# Patient Record
Sex: MALE | URBAN - METROPOLITAN AREA
[De-identification: names, ages, dates, MRNs, and addresses within clinical notes are randomized per-mention and may not be internally consistent; named-entity substitution may affect disease eponyms.]

---

## 2024-07-08 PROCEDURE — 93306 TTE W/DOPPLER COMPLETE: CPT | Performed by: INTERNAL MEDICINE

## 2024-08-09 ENCOUNTER — HOSPITAL ENCOUNTER (INPATIENT)
Age: 64
LOS: 6 days | Discharge: HOME | DRG: 432 | End: 2024-08-15
Attending: STUDENT IN AN ORGANIZED HEALTH CARE EDUCATION/TRAINING PROGRAM | Admitting: STUDENT IN AN ORGANIZED HEALTH CARE EDUCATION/TRAINING PROGRAM

## 2024-08-09 DIAGNOSIS — K70.31 ASCITES DUE TO ALCOHOLIC CIRRHOSIS (HCC): ICD-10-CM

## 2024-08-09 DIAGNOSIS — I42.9 CARDIOMYOPATHY, UNSPECIFIED TYPE (HCC): Primary | ICD-10-CM

## 2024-08-09 DIAGNOSIS — K92.1 MELENA: ICD-10-CM

## 2024-08-09 PROBLEM — K92.2 ACUTE UPPER GI BLEEDING: Status: ACTIVE | Noted: 2024-08-09

## 2024-08-09 LAB
ALBUMIN SERPL-MCNC: 2.2 G/DL (ref 3.5–5.2)
ALBUMIN/GLOB SERPL: 1 {RATIO} (ref 1–2.5)
ALP SERPL-CCNC: 83 U/L (ref 40–129)
ALT SERPL-CCNC: 26 U/L (ref 10–50)
AMMONIA PLAS-SCNC: 54 UMOL/L (ref 16–60)
ANION GAP SERPL CALCULATED.3IONS-SCNC: 10 MMOL/L (ref 9–16)
AST SERPL-CCNC: 69 U/L (ref 10–50)
BASOPHILS # BLD: 0 K/UL (ref 0–0.2)
BASOPHILS NFR BLD: 0 % (ref 0–2)
BILIRUB DIRECT SERPL-MCNC: 3.1 MG/DL (ref 0–0.2)
BILIRUB INDIRECT SERPL-MCNC: 4 MG/DL (ref 0–1)
BILIRUB SERPL-MCNC: 7.1 MG/DL (ref 0–1.2)
BUN SERPL-MCNC: 33 MG/DL (ref 8–23)
CALCIUM SERPL-MCNC: 8.2 MG/DL (ref 8.6–10.4)
CHLORIDE SERPL-SCNC: 101 MMOL/L (ref 98–107)
CO2 SERPL-SCNC: 18 MMOL/L (ref 20–31)
CREAT SERPL-MCNC: 1.2 MG/DL (ref 0.7–1.2)
EOSINOPHIL # BLD: 0 K/UL (ref 0–0.4)
EOSINOPHILS RELATIVE PERCENT: 0 % (ref 1–4)
GFR, ESTIMATED: 71 ML/MIN/1.73M2
GLOBULIN SER CALC-MCNC: 4.3 G/DL
GLUCOSE SERPL-MCNC: 116 MG/DL (ref 74–99)
HCT VFR BLD AUTO: 23.6 % (ref 40.7–50.3)
HGB BLD-MCNC: 7.6 G/DL (ref 13–17)
IMM GRANULOCYTES # BLD AUTO: 0.12 K/UL (ref 0–0.3)
IMM GRANULOCYTES NFR BLD: 1 %
INR PPP: 3
LYMPHOCYTES NFR BLD: 1.71 K/UL (ref 1–4.8)
LYMPHOCYTES RELATIVE PERCENT: 14 % (ref 24–44)
MCH RBC QN AUTO: 36.4 PG (ref 25.2–33.5)
MCHC RBC AUTO-ENTMCNC: 32.2 G/DL (ref 28.4–34.8)
MCV RBC AUTO: 112.9 FL (ref 82.6–102.9)
MONOCYTES NFR BLD: 1.59 K/UL (ref 0.1–0.8)
MONOCYTES NFR BLD: 13 % (ref 1–7)
MORPHOLOGY: ABNORMAL
NEUTROPHILS NFR BLD: 72 % (ref 36–66)
NEUTS SEG NFR BLD: 8.78 K/UL (ref 1.8–7.7)
NRBC BLD-RTO: 0 PER 100 WBC
PLATELET # BLD AUTO: ABNORMAL K/UL (ref 138–453)
PLATELET, FLUORESCENCE: 112 K/UL (ref 138–453)
PLATELETS.RETICULATED NFR BLD AUTO: 1.8 % (ref 1.1–10.3)
POTASSIUM SERPL-SCNC: 4.6 MMOL/L (ref 3.7–5.3)
PROT SERPL-MCNC: 6.5 G/DL (ref 6.6–8.7)
PROTHROMBIN TIME: 30.4 SEC (ref 11.7–14.9)
RBC # BLD AUTO: 2.09 M/UL (ref 4.21–5.77)
SODIUM SERPL-SCNC: 129 MMOL/L (ref 136–145)
WBC OTHER # BLD: 12.2 K/UL (ref 3.5–11.3)

## 2024-08-09 PROCEDURE — 2580000003 HC RX 258

## 2024-08-09 PROCEDURE — 2700000000 HC OXYGEN THERAPY PER DAY

## 2024-08-09 PROCEDURE — 6360000002 HC RX W HCPCS

## 2024-08-09 PROCEDURE — 88112 CYTOPATH CELL ENHANCE TECH: CPT

## 2024-08-09 PROCEDURE — 36415 COLL VENOUS BLD VENIPUNCTURE: CPT

## 2024-08-09 PROCEDURE — 6370000000 HC RX 637 (ALT 250 FOR IP)

## 2024-08-09 PROCEDURE — 85025 COMPLETE CBC W/AUTO DIFF WBC: CPT

## 2024-08-09 PROCEDURE — 94640 AIRWAY INHALATION TREATMENT: CPT

## 2024-08-09 PROCEDURE — 85610 PROTHROMBIN TIME: CPT

## 2024-08-09 PROCEDURE — 51702 INSERT TEMP BLADDER CATH: CPT

## 2024-08-09 PROCEDURE — 6370000000 HC RX 637 (ALT 250 FOR IP): Performed by: STUDENT IN AN ORGANIZED HEALTH CARE EDUCATION/TRAINING PROGRAM

## 2024-08-09 PROCEDURE — 82140 ASSAY OF AMMONIA: CPT

## 2024-08-09 PROCEDURE — 87040 BLOOD CULTURE FOR BACTERIA: CPT

## 2024-08-09 PROCEDURE — 2000000000 HC ICU R&B

## 2024-08-09 PROCEDURE — 80048 BASIC METABOLIC PNL TOTAL CA: CPT

## 2024-08-09 PROCEDURE — 87641 MR-STAPH DNA AMP PROBE: CPT

## 2024-08-09 PROCEDURE — 88305 TISSUE EXAM BY PATHOLOGIST: CPT

## 2024-08-09 PROCEDURE — P9047 ALBUMIN (HUMAN), 25%, 50ML: HCPCS

## 2024-08-09 PROCEDURE — 80076 HEPATIC FUNCTION PANEL: CPT

## 2024-08-09 PROCEDURE — 94761 N-INVAS EAR/PLS OXIMETRY MLT: CPT

## 2024-08-09 PROCEDURE — 85055 RETICULATED PLATELET ASSAY: CPT

## 2024-08-09 RX ORDER — SODIUM CHLORIDE 0.9 % (FLUSH) 0.9 %
5-40 SYRINGE (ML) INJECTION PRN
Status: DISCONTINUED | OUTPATIENT
Start: 2024-08-09 | End: 2024-08-15 | Stop reason: HOSPADM

## 2024-08-09 RX ORDER — SPIRONOLACTONE 25 MG/1
12.5 TABLET ORAL DAILY
Status: DISCONTINUED | OUTPATIENT
Start: 2024-08-09 | End: 2024-08-12

## 2024-08-09 RX ORDER — ONDANSETRON 2 MG/ML
4 INJECTION INTRAMUSCULAR; INTRAVENOUS EVERY 6 HOURS PRN
Status: DISCONTINUED | OUTPATIENT
Start: 2024-08-09 | End: 2024-08-15 | Stop reason: HOSPADM

## 2024-08-09 RX ORDER — LACTULOSE 10 G/15ML
20 SOLUTION ORAL 3 TIMES DAILY
Status: DISCONTINUED | OUTPATIENT
Start: 2024-08-09 | End: 2024-08-15 | Stop reason: HOSPADM

## 2024-08-09 RX ORDER — ALBUMIN (HUMAN) 12.5 G/50ML
25 SOLUTION INTRAVENOUS ONCE
Status: COMPLETED | OUTPATIENT
Start: 2024-08-09 | End: 2024-08-09

## 2024-08-09 RX ORDER — FUROSEMIDE 20 MG/1
20 TABLET ORAL DAILY
Status: DISCONTINUED | OUTPATIENT
Start: 2024-08-09 | End: 2024-08-12

## 2024-08-09 RX ORDER — LEVOFLOXACIN 5 MG/ML
500 INJECTION, SOLUTION INTRAVENOUS EVERY 24 HOURS
Status: DISCONTINUED | OUTPATIENT
Start: 2024-08-09 | End: 2024-08-11

## 2024-08-09 RX ORDER — POTASSIUM CHLORIDE 7.45 MG/ML
10 INJECTION INTRAVENOUS PRN
Status: DISCONTINUED | OUTPATIENT
Start: 2024-08-09 | End: 2024-08-15 | Stop reason: HOSPADM

## 2024-08-09 RX ORDER — LEVOFLOXACIN 5 MG/ML
500 INJECTION, SOLUTION INTRAVENOUS EVERY 24 HOURS
Status: DISCONTINUED | OUTPATIENT
Start: 2024-08-09 | End: 2024-08-09

## 2024-08-09 RX ORDER — ACETAMINOPHEN 650 MG/1
650 SUPPOSITORY RECTAL EVERY 6 HOURS PRN
Status: DISCONTINUED | OUTPATIENT
Start: 2024-08-09 | End: 2024-08-15 | Stop reason: HOSPADM

## 2024-08-09 RX ORDER — ONDANSETRON 4 MG/1
4 TABLET, ORALLY DISINTEGRATING ORAL EVERY 8 HOURS PRN
Status: DISCONTINUED | OUTPATIENT
Start: 2024-08-09 | End: 2024-08-15 | Stop reason: HOSPADM

## 2024-08-09 RX ORDER — LEVOFLOXACIN 5 MG/ML
250 INJECTION, SOLUTION INTRAVENOUS EVERY 24 HOURS
Status: DISCONTINUED | OUTPATIENT
Start: 2024-08-09 | End: 2024-08-09

## 2024-08-09 RX ORDER — ACETAMINOPHEN 325 MG/1
650 TABLET ORAL EVERY 6 HOURS PRN
Status: DISCONTINUED | OUTPATIENT
Start: 2024-08-09 | End: 2024-08-15 | Stop reason: HOSPADM

## 2024-08-09 RX ORDER — POLYETHYLENE GLYCOL 3350 17 G/17G
17 POWDER, FOR SOLUTION ORAL DAILY PRN
Status: DISCONTINUED | OUTPATIENT
Start: 2024-08-09 | End: 2024-08-15 | Stop reason: HOSPADM

## 2024-08-09 RX ORDER — ALBUTEROL SULFATE 2.5 MG/3ML
2.5 SOLUTION RESPIRATORY (INHALATION) EVERY 4 HOURS PRN
Status: DISCONTINUED | OUTPATIENT
Start: 2024-08-09 | End: 2024-08-15 | Stop reason: HOSPADM

## 2024-08-09 RX ORDER — LEVOFLOXACIN 5 MG/ML
250 INJECTION, SOLUTION INTRAVENOUS EVERY 24 HOURS
Status: DISCONTINUED | OUTPATIENT
Start: 2024-08-09 | End: 2024-08-11

## 2024-08-09 RX ORDER — POTASSIUM CHLORIDE 20 MEQ/1
40 TABLET, EXTENDED RELEASE ORAL PRN
Status: DISCONTINUED | OUTPATIENT
Start: 2024-08-09 | End: 2024-08-15 | Stop reason: HOSPADM

## 2024-08-09 RX ORDER — SODIUM CHLORIDE 0.9 % (FLUSH) 0.9 %
5-40 SYRINGE (ML) INJECTION EVERY 12 HOURS SCHEDULED
Status: DISCONTINUED | OUTPATIENT
Start: 2024-08-09 | End: 2024-08-15 | Stop reason: HOSPADM

## 2024-08-09 RX ORDER — SODIUM CHLORIDE 9 MG/ML
INJECTION, SOLUTION INTRAVENOUS PRN
Status: DISCONTINUED | OUTPATIENT
Start: 2024-08-09 | End: 2024-08-15 | Stop reason: HOSPADM

## 2024-08-09 RX ORDER — LEVOFLOXACIN 5 MG/ML
750 INJECTION, SOLUTION INTRAVENOUS EVERY 24 HOURS
Status: DISCONTINUED | OUTPATIENT
Start: 2024-08-09 | End: 2024-08-09 | Stop reason: CLARIF

## 2024-08-09 RX ORDER — MAGNESIUM SULFATE IN WATER 40 MG/ML
2000 INJECTION, SOLUTION INTRAVENOUS PRN
Status: DISCONTINUED | OUTPATIENT
Start: 2024-08-09 | End: 2024-08-15 | Stop reason: HOSPADM

## 2024-08-09 RX ORDER — IPRATROPIUM BROMIDE AND ALBUTEROL SULFATE 2.5; .5 MG/3ML; MG/3ML
1 SOLUTION RESPIRATORY (INHALATION) 3 TIMES DAILY
Status: DISCONTINUED | OUTPATIENT
Start: 2024-08-09 | End: 2024-08-10

## 2024-08-09 RX ADMIN — IPRATROPIUM BROMIDE AND ALBUTEROL SULFATE 1 DOSE: .5; 2.5 SOLUTION RESPIRATORY (INHALATION) at 22:52

## 2024-08-09 RX ADMIN — SPIRONOLACTONE 12.5 MG: 25 TABLET, FILM COATED ORAL at 21:03

## 2024-08-09 RX ADMIN — PANTOPRAZOLE SODIUM 8 MG/HR: 40 INJECTION, POWDER, FOR SOLUTION INTRAVENOUS at 20:49

## 2024-08-09 RX ADMIN — FUROSEMIDE 20 MG: 20 TABLET ORAL at 21:03

## 2024-08-09 RX ADMIN — OCTREOTIDE ACETATE 25 MCG/HR: 500 INJECTION, SOLUTION INTRAVENOUS; SUBCUTANEOUS at 20:52

## 2024-08-09 RX ADMIN — SODIUM CHLORIDE: 9 INJECTION, SOLUTION INTRAVENOUS at 20:51

## 2024-08-09 RX ADMIN — SODIUM CHLORIDE, PRESERVATIVE FREE 10 ML: 5 INJECTION INTRAVENOUS at 20:55

## 2024-08-09 RX ADMIN — LEVOFLOXACIN 500 MG: 5 INJECTION, SOLUTION INTRAVENOUS at 21:13

## 2024-08-09 RX ADMIN — LEVOFLOXACIN 250 MG: 5 INJECTION, SOLUTION INTRAVENOUS at 22:33

## 2024-08-09 RX ADMIN — RIFAXIMIN 550 MG: 550 TABLET ORAL at 21:03

## 2024-08-09 RX ADMIN — ALBUMIN (HUMAN) 25 G: 0.25 INJECTION, SOLUTION INTRAVENOUS at 20:54

## 2024-08-09 NOTE — H&P
Critical Care - History and Physical Examination    Patient's name:  Jose Spence  Medical Record Number: [id number]  Patient's account/billing number: [id number]  Patient's YOB: 1960  Age: 64 y.o.  Date of Admission: 8/9/2024  5:27 PM  Reason of ICU admission:   Date of History and Physical Examination: 8/9/2024      Primary Care Physician: No primary care provider on file.  Attending Physician:    Code Status: Full Code    Chief complaint: Shortness of breath likely secondary to hepatopulmonary syndrome secondary to decompensated liver cirrhosis secondary to alcohol abuse    Reason for ICU admission:   Hemoglobin 5.4, active melena, 1 episode of hematemesis, abdominal ascites leading to respiratory distress    History Of Present Illness:   History was obtained from chart review and the patient.      Jose Spence is a 64 y.o. with unknown past medical history as he told us that he has not seen any physician in last 35 years came into the [facility] ICU due to worsening shortness of breath from huge abdominal tense ascites as a transfer from outlying facility.  Of note patient was admitted in [facility] on July 8 for worsening abdominal distention and bloating.  Upon further questioning that time patient stated he is persistently short of breath and has been gradually worsening.  Is been ongoing for last 5 months.  He reported her file and that he is having bloody stool that has been ongoing for last few month since March of this year however he has not seek any medical attention for that.  Patient drinks about 5-6 drinks daily for many years.  He told me that he bought a bar.  He does smoke half a pack a day.  Patient was evaluated by GI in [facility] last month however patient left AMA and never followed up with liver clinic.  GI at that time offered upper EGD and colonoscopy however patient never followed up with them for further consultation.  Today on 8/9/2024 patient

## 2024-08-10 ENCOUNTER — APPOINTMENT (OUTPATIENT)
Dept: ULTRASOUND IMAGING | Age: 64
DRG: 432 | End: 2024-08-10
Attending: STUDENT IN AN ORGANIZED HEALTH CARE EDUCATION/TRAINING PROGRAM

## 2024-08-10 PROBLEM — K70.31 ASCITES DUE TO ALCOHOLIC CIRRHOSIS (HCC): Status: ACTIVE | Noted: 2024-08-10

## 2024-08-10 LAB
A1AT SERPL-MCNC: 79 MG/DL (ref 90–200)
AFP SERPL-MCNC: 3.7 UG/L
ALBUMIN FLD-MCNC: 0.4 G/DL
ALBUMIN SERPL-MCNC: 2.6 G/DL (ref 3.5–5.2)
ALBUMIN/GLOB SERPL: 1 {RATIO} (ref 1–2.5)
ALP SERPL-CCNC: 69 U/L (ref 40–129)
ALT SERPL-CCNC: 25 U/L (ref 10–50)
AMYLASE FLD-CCNC: 23 U/L
ANION GAP SERPL CALCULATED.3IONS-SCNC: 10 MMOL/L (ref 9–16)
ANION GAP SERPL CALCULATED.3IONS-SCNC: 11 MMOL/L (ref 9–16)
ANION GAP SERPL CALCULATED.3IONS-SCNC: 7 MMOL/L (ref 9–16)
APPEARANCE FLD: NORMAL
AST SERPL-CCNC: 65 U/L (ref 10–50)
BASOPHILS # BLD: 0 K/UL (ref 0–0.2)
BASOPHILS NFR BLD: 0 % (ref 0–2)
BILIRUB DIRECT SERPL-MCNC: 3.4 MG/DL (ref 0–0.2)
BILIRUB INDIRECT SERPL-MCNC: 4.7 MG/DL (ref 0–1)
BILIRUB SERPL-MCNC: 8.1 MG/DL (ref 0–1.2)
BODY FLD TYPE: NORMAL
BUN SERPL-MCNC: 37 MG/DL (ref 8–23)
BUN SERPL-MCNC: 40 MG/DL (ref 8–23)
CALCIUM SERPL-MCNC: 8.3 MG/DL (ref 8.6–10.4)
CALCIUM SERPL-MCNC: 8.6 MG/DL (ref 8.6–10.4)
CERULOPLASMIN SERPL-MCNC: 10 MG/DL (ref 15–30)
CHLORIDE SERPL-SCNC: 101 MMOL/L (ref 98–107)
CHLORIDE SERPL-SCNC: 99 MMOL/L (ref 98–107)
CHLORIDE SERPL-SCNC: 99 MMOL/L (ref 98–107)
CHOLEST SERPL-MCNC: 63 MG/DL (ref 0–199)
CHOLESTEROL/HDL RATIO: 4
CLOT CHECK: NORMAL
CO2 SERPL-SCNC: 18 MMOL/L (ref 20–31)
CO2 SERPL-SCNC: 20 MMOL/L (ref 20–31)
CO2 SERPL-SCNC: 22 MMOL/L (ref 20–31)
COLOR FLD: YELLOW
CREAT SERPL-MCNC: 1.3 MG/DL (ref 0.7–1.2)
CREAT SERPL-MCNC: 1.5 MG/DL (ref 0.7–1.2)
EOSINOPHIL # BLD: 0.2 K/UL (ref 0–0.44)
EOSINOPHILS RELATIVE PERCENT: 2 % (ref 1–4)
FERRITIN SERPL-MCNC: 736 NG/ML (ref 30–400)
GFR, ESTIMATED: 54 ML/MIN/1.73M2
GFR, ESTIMATED: 61 ML/MIN/1.73M2
GLOBULIN SER CALC-MCNC: 3.7 G/DL
GLUCOSE BLD-MCNC: 111 MG/DL (ref 75–110)
GLUCOSE BLD-MCNC: 116 MG/DL (ref 75–110)
GLUCOSE BLD-MCNC: 120 MG/DL (ref 75–110)
GLUCOSE FLD-MCNC: 115 MG/DL
GLUCOSE SERPL-MCNC: 105 MG/DL (ref 74–99)
GLUCOSE SERPL-MCNC: 130 MG/DL (ref 74–99)
HAV IGM SERPL QL IA: NONREACTIVE
HBV CORE IGM SERPL QL IA: NONREACTIVE
HBV SURFACE AG SERPL QL IA: NONREACTIVE
HCT VFR BLD AUTO: 19.4 % (ref 40.7–50.3)
HCT VFR BLD AUTO: 20.9 % (ref 40.7–50.3)
HCT VFR BLD AUTO: 22 % (ref 40.7–50.3)
HCT VFR BLD AUTO: 22.9 % (ref 40.7–50.3)
HCV AB SERPL QL IA: REACTIVE
HDLC SERPL-MCNC: 18 MG/DL
HGB BLD-MCNC: 6.6 G/DL (ref 13–17)
HGB BLD-MCNC: 7.1 G/DL (ref 13–17)
HGB BLD-MCNC: 7.5 G/DL (ref 13–17)
HGB BLD-MCNC: 7.6 G/DL (ref 13–17)
IMM GRANULOCYTES # BLD AUTO: 0.1 K/UL (ref 0–0.3)
IMM GRANULOCYTES NFR BLD: 1 %
INR PPP: 3.2
IRON SATN MFR SERPL: ABNORMAL % (ref 20–55)
IRON SERPL-MCNC: 136 UG/DL (ref 61–157)
LDH FLD L TO P-CCNC: 57 U/L
LDLC SERPL CALC-MCNC: 35 MG/DL (ref 0–100)
LYMPHOCYTES NFR BLD: 2.25 K/UL (ref 1.1–3.7)
LYMPHOCYTES NFR FLD: 22 %
LYMPHOCYTES RELATIVE PERCENT: 23 % (ref 24–43)
MCH RBC QN AUTO: 36.3 PG (ref 25.2–33.5)
MCHC RBC AUTO-ENTMCNC: 33.5 G/DL (ref 28.4–34.8)
MCV RBC AUTO: 108.4 FL (ref 82.6–102.9)
MESOTHELIAL CELLS BODY FLUID: 10 %
MONOCYTES NFR BLD: 1.27 K/UL (ref 0.1–1.2)
MONOCYTES NFR BLD: 13 % (ref 3–12)
MONOCYTES NFR FLD: 43 %
MORPHOLOGY: ABNORMAL
MORPHOLOGY: ABNORMAL
MRSA, DNA, NASAL: NEGATIVE
NEUTROPHILS NFR BLD: 61 % (ref 36–65)
NEUTROPHILS NFR FLD: 25 %
NEUTS SEG NFR BLD: 5.98 K/UL (ref 1.5–8.1)
NRBC BLD-RTO: 0.3 PER 100 WBC
NUC CELL # FLD: 40 CELLS/UL
OSMOLALITY SERPL: 286 MOSM/KG (ref 275–295)
PLATELET # BLD AUTO: ABNORMAL K/UL (ref 138–453)
PLATELET, FLUORESCENCE: 90 K/UL (ref 138–453)
PLATELETS.RETICULATED NFR BLD AUTO: 1.9 % (ref 1.1–10.3)
POTASSIUM SERPL-SCNC: 4.2 MMOL/L (ref 3.7–5.3)
POTASSIUM SERPL-SCNC: 4.5 MMOL/L (ref 3.7–5.3)
POTASSIUM SERPL-SCNC: 4.8 MMOL/L (ref 3.7–5.3)
PROT FLD-MCNC: 0.8 G/DL
PROT SERPL-MCNC: 6.3 G/DL (ref 6.6–8.7)
PROTHROMBIN TIME: 31.5 SEC (ref 11.7–14.9)
RBC # BLD AUTO: 1.79 M/UL (ref 4.21–5.77)
RBC # FLD: <3000 CELLS/UL
SODIUM SERPL-SCNC: 128 MMOL/L (ref 136–145)
SODIUM SERPL-SCNC: 129 MMOL/L (ref 136–145)
SODIUM SERPL-SCNC: 130 MMOL/L (ref 136–145)
SPECIMEN DESCRIPTION: NORMAL
SPECIMEN TYPE: NORMAL
TIBC SERPL-MCNC: ABNORMAL UG/DL (ref 250–450)
TRIGL SERPL-MCNC: 53 MG/DL
UNSATURATED IRON BINDING CAPACITY: <17 UG/DL (ref 112–347)
VLDLC SERPL CALC-MCNC: 11 MG/DL
WBC OTHER # BLD: 9.8 K/UL (ref 3.5–11.3)

## 2024-08-10 PROCEDURE — 85018 HEMOGLOBIN: CPT

## 2024-08-10 PROCEDURE — 86901 BLOOD TYPING SEROLOGIC RH(D): CPT

## 2024-08-10 PROCEDURE — 36430 TRANSFUSION BLD/BLD COMPNT: CPT

## 2024-08-10 PROCEDURE — 99291 CRITICAL CARE FIRST HOUR: CPT | Performed by: INTERNAL MEDICINE

## 2024-08-10 PROCEDURE — 6370000000 HC RX 637 (ALT 250 FOR IP)

## 2024-08-10 PROCEDURE — 87205 SMEAR GRAM STAIN: CPT

## 2024-08-10 PROCEDURE — 2000000000 HC ICU R&B

## 2024-08-10 PROCEDURE — 86900 BLOOD TYPING SEROLOGIC ABO: CPT

## 2024-08-10 PROCEDURE — 82042 OTHER SOURCE ALBUMIN QUAN EA: CPT

## 2024-08-10 PROCEDURE — 2580000003 HC RX 258: Performed by: NURSE PRACTITIONER

## 2024-08-10 PROCEDURE — 85055 RETICULATED PLATELET ASSAY: CPT

## 2024-08-10 PROCEDURE — 86920 COMPATIBILITY TEST SPIN: CPT

## 2024-08-10 PROCEDURE — 6360000002 HC RX W HCPCS

## 2024-08-10 PROCEDURE — 0W9G3ZX DRAINAGE OF PERITONEAL CAVITY, PERCUTANEOUS APPROACH, DIAGNOSTIC: ICD-10-PCS | Performed by: INTERNAL MEDICINE

## 2024-08-10 PROCEDURE — P9047 ALBUMIN (HUMAN), 25%, 50ML: HCPCS

## 2024-08-10 PROCEDURE — 82105 ALPHA-FETOPROTEIN SERUM: CPT

## 2024-08-10 PROCEDURE — 82150 ASSAY OF AMYLASE: CPT

## 2024-08-10 PROCEDURE — 83516 IMMUNOASSAY NONANTIBODY: CPT

## 2024-08-10 PROCEDURE — 6370000000 HC RX 637 (ALT 250 FOR IP): Performed by: STUDENT IN AN ORGANIZED HEALTH CARE EDUCATION/TRAINING PROGRAM

## 2024-08-10 PROCEDURE — 83550 IRON BINDING TEST: CPT

## 2024-08-10 PROCEDURE — 82390 ASSAY OF CERULOPLASMIN: CPT

## 2024-08-10 PROCEDURE — 82945 GLUCOSE OTHER FLUID: CPT

## 2024-08-10 PROCEDURE — 86850 RBC ANTIBODY SCREEN: CPT

## 2024-08-10 PROCEDURE — 83930 ASSAY OF BLOOD OSMOLALITY: CPT

## 2024-08-10 PROCEDURE — P9017 PLASMA 1 DONOR FRZ W/IN 8 HR: HCPCS

## 2024-08-10 PROCEDURE — 85025 COMPLETE CBC W/AUTO DIFF WBC: CPT

## 2024-08-10 PROCEDURE — 85014 HEMATOCRIT: CPT

## 2024-08-10 PROCEDURE — 80051 ELECTROLYTE PANEL: CPT

## 2024-08-10 PROCEDURE — 86927 PLASMA FRESH FROZEN: CPT

## 2024-08-10 PROCEDURE — 83615 LACTATE (LD) (LDH) ENZYME: CPT

## 2024-08-10 PROCEDURE — 49083 ABD PARACENTESIS W/IMAGING: CPT | Performed by: INTERNAL MEDICINE

## 2024-08-10 PROCEDURE — 2700000000 HC OXYGEN THERAPY PER DAY

## 2024-08-10 PROCEDURE — P9016 RBC LEUKOCYTES REDUCED: HCPCS

## 2024-08-10 PROCEDURE — 80074 ACUTE HEPATITIS PANEL: CPT

## 2024-08-10 PROCEDURE — 82103 ALPHA-1-ANTITRYPSIN TOTAL: CPT

## 2024-08-10 PROCEDURE — 76705 ECHO EXAM OF ABDOMEN: CPT

## 2024-08-10 PROCEDURE — 6360000002 HC RX W HCPCS: Performed by: NURSE PRACTITIONER

## 2024-08-10 PROCEDURE — 87070 CULTURE OTHR SPECIMN AEROBIC: CPT

## 2024-08-10 PROCEDURE — 80076 HEPATIC FUNCTION PANEL: CPT

## 2024-08-10 PROCEDURE — 99255 IP/OBS CONSLTJ NEW/EST HI 80: CPT | Performed by: STUDENT IN AN ORGANIZED HEALTH CARE EDUCATION/TRAINING PROGRAM

## 2024-08-10 PROCEDURE — 2580000003 HC RX 258

## 2024-08-10 PROCEDURE — 87075 CULTR BACTERIA EXCEPT BLOOD: CPT

## 2024-08-10 PROCEDURE — 85610 PROTHROMBIN TIME: CPT

## 2024-08-10 PROCEDURE — 93975 VASCULAR STUDY: CPT

## 2024-08-10 PROCEDURE — 94761 N-INVAS EAR/PLS OXIMETRY MLT: CPT

## 2024-08-10 PROCEDURE — 82947 ASSAY GLUCOSE BLOOD QUANT: CPT

## 2024-08-10 PROCEDURE — 83540 ASSAY OF IRON: CPT

## 2024-08-10 PROCEDURE — 80061 LIPID PANEL: CPT

## 2024-08-10 PROCEDURE — 82728 ASSAY OF FERRITIN: CPT

## 2024-08-10 PROCEDURE — 36415 COLL VENOUS BLD VENIPUNCTURE: CPT

## 2024-08-10 PROCEDURE — 86376 MICROSOMAL ANTIBODY EACH: CPT

## 2024-08-10 PROCEDURE — 89051 BODY FLUID CELL COUNT: CPT

## 2024-08-10 PROCEDURE — APPNB60 APP NON BILLABLE TIME 46-60 MINS: Performed by: NURSE PRACTITIONER

## 2024-08-10 PROCEDURE — 86225 DNA ANTIBODY NATIVE: CPT

## 2024-08-10 PROCEDURE — 84157 ASSAY OF PROTEIN OTHER: CPT

## 2024-08-10 PROCEDURE — 86038 ANTINUCLEAR ANTIBODIES: CPT

## 2024-08-10 PROCEDURE — 80048 BASIC METABOLIC PNL TOTAL CA: CPT

## 2024-08-10 PROCEDURE — 30233N1 TRANSFUSION OF NONAUTOLOGOUS RED BLOOD CELLS INTO PERIPHERAL VEIN, PERCUTANEOUS APPROACH: ICD-10-PCS

## 2024-08-10 RX ORDER — SODIUM CHLORIDE 9 MG/ML
INJECTION, SOLUTION INTRAVENOUS PRN
Status: COMPLETED | OUTPATIENT
Start: 2024-08-10 | End: 2024-08-10

## 2024-08-10 RX ORDER — FUROSEMIDE 20 MG/1
20 TABLET ORAL ONCE
Status: COMPLETED | OUTPATIENT
Start: 2024-08-10 | End: 2024-08-11

## 2024-08-10 RX ORDER — OXYCODONE HYDROCHLORIDE 5 MG/1
5 TABLET ORAL EVERY 4 HOURS PRN
Status: DISCONTINUED | OUTPATIENT
Start: 2024-08-10 | End: 2024-08-15 | Stop reason: HOSPADM

## 2024-08-10 RX ORDER — ALBUMIN (HUMAN) 12.5 G/50ML
25 SOLUTION INTRAVENOUS ONCE
Status: COMPLETED | OUTPATIENT
Start: 2024-08-10 | End: 2024-08-10

## 2024-08-10 RX ADMIN — PANTOPRAZOLE SODIUM 8 MG/HR: 40 INJECTION, POWDER, FOR SOLUTION INTRAVENOUS at 14:50

## 2024-08-10 RX ADMIN — OCTREOTIDE ACETATE 50 MCG/HR: 500 INJECTION, SOLUTION INTRAVENOUS; SUBCUTANEOUS at 14:49

## 2024-08-10 RX ADMIN — ALBUMIN (HUMAN) 25 G: 0.25 INJECTION, SOLUTION INTRAVENOUS at 00:53

## 2024-08-10 RX ADMIN — ALBUMIN (HUMAN) 25 G: 0.25 INJECTION, SOLUTION INTRAVENOUS at 14:10

## 2024-08-10 RX ADMIN — LEVOFLOXACIN 250 MG: 5 INJECTION, SOLUTION INTRAVENOUS at 21:38

## 2024-08-10 RX ADMIN — SODIUM CHLORIDE, PRESERVATIVE FREE 10 ML: 5 INJECTION INTRAVENOUS at 19:52

## 2024-08-10 RX ADMIN — LACTULOSE 20 G: 20 SOLUTION ORAL at 21:35

## 2024-08-10 RX ADMIN — PANTOPRAZOLE SODIUM 8 MG/HR: 40 INJECTION, POWDER, FOR SOLUTION INTRAVENOUS at 04:58

## 2024-08-10 RX ADMIN — SODIUM CHLORIDE: 9 INJECTION, SOLUTION INTRAVENOUS at 09:31

## 2024-08-10 RX ADMIN — SODIUM CHLORIDE: 9 INJECTION, SOLUTION INTRAVENOUS at 22:56

## 2024-08-10 RX ADMIN — OXYCODONE HYDROCHLORIDE 5 MG: 5 TABLET ORAL at 21:35

## 2024-08-10 RX ADMIN — Medication 5 MG: at 02:47

## 2024-08-10 RX ADMIN — LEVOFLOXACIN 500 MG: 5 INJECTION, SOLUTION INTRAVENOUS at 20:03

## 2024-08-10 RX ADMIN — SODIUM CHLORIDE, PRESERVATIVE FREE 10 ML: 5 INJECTION INTRAVENOUS at 09:36

## 2024-08-10 RX ADMIN — RIFAXIMIN 550 MG: 550 TABLET ORAL at 21:30

## 2024-08-10 RX ADMIN — PHYTONADIONE 10 MG: 10 INJECTION, EMULSION INTRAMUSCULAR; INTRAVENOUS; SUBCUTANEOUS at 12:18

## 2024-08-10 ASSESSMENT — PAIN SCALES - GENERAL
PAINLEVEL_OUTOF10: 7
PAINLEVEL_OUTOF10: 0
PAINLEVEL_OUTOF10: 4
PAINLEVEL_OUTOF10: 7
PAINLEVEL_OUTOF10: 0
PAINLEVEL_OUTOF10: 7

## 2024-08-10 ASSESSMENT — PAIN DESCRIPTION - DESCRIPTORS
DESCRIPTORS: ACHING
DESCRIPTORS: ACHING

## 2024-08-10 ASSESSMENT — PAIN DESCRIPTION - ORIENTATION
ORIENTATION: RIGHT;UPPER
ORIENTATION: INNER;UPPER

## 2024-08-10 ASSESSMENT — PAIN DESCRIPTION - LOCATION
LOCATION: LEG
LOCATION: LEG

## 2024-08-10 NOTE — CARE COORDINATION
Case Management Assessment  Initial Evaluation    Date/Time of Evaluation: 8/10/2024 12:34 PM  Assessment Completed by: ROB NARVAEZ RN    If patient is discharged prior to next notation, then this note serves as note for discharge by case management.    Patient Name: Jose Spence                   YOB: 1960  Diagnosis: Acute upper GI bleeding [K92.2]                   Date / Time: 8/9/2024  5:27 PM    Patient Admission Status: Inpatient   Readmission Risk (Low < 19, Mod (19-27), High > 27): Readmission Risk Score: 13.9    Current PCP: No primary care provider on file.  PCP verified by CM? (P) No (NO PCP, list provided w/ [facility] in [place])    Chart Reviewed: Yes      History Provided by: (P) Patient  Patient Orientation: (P) Alert and Oriented    Patient Cognition: (P) Alert    Hospitalization in the last 30 days (Readmission):  No    If yes, Readmission Assessment in CM Navigator will be completed.    Advance Directives:      Code Status: Full Code   Patient's Primary Decision Maker is: (P) Legal Next of Kin      Discharge Planning:    Patient lives with:   Type of Home: (P) House, Other (Comment) (Business/ Home (upper level))  Primary Care Giver: (P) Self  Patient Support Systems include: (P) Spouse/Significant Other   Current Financial resources: (P) HELP (No insurance, LM for [employer] w/ HELP)  Current community resources: (P) None  Current services prior to admission: (P) None            Current DME:              Type of Home Care services:  (P) None    ADLS  Prior functional level: (P) Independent in ADLs/IADLs  Current functional level: (P) Independent in ADLs/IADLs    PT AM-PAC:   /24  OT AM-PAC:   /24    Family can provide assistance at DC: (P) No  Would you like Case Management to discuss the discharge plan with any other family members/significant others, and if so, who? (P) Yes (SO)  Plans to Return to Present Housing: (P) Unknown at present  Other Identified  Issues/Barriers to RETURNING to current housing: Medical condition  Potential Assistance needed at discharge: (P) Long Term Acute Care, Skilled Nursing Facility, Home Care, Transportation            Potential DME:    Patient expects to discharge to: (P) House  Plan for transportation at discharge:      Financial    Payor: /     Does insurance require precert for SNF: no insurance- will need HELP    Potential assistance Purchasing Medications: (P) Yes  Meds-to-Beds request: Yes    No Pharmacies Listed    Notes:    Factors facilitating achievement of predicted outcomes:     Barriers to discharge: Medical complications    Additional Case Management Notes: Paracentesis today, possible EGD on Monday, H & H Q6hrs, PT/OT ordered, plan is home independently, friend will transport, no PCP- list provided, no Insurance- will need HELP and Med Assist.     Patient states he has two children, does not want them listed as emergency contacts- no other informed provided concerning children.     Pt lives at [address] [place] [address], address updated in EPIC    The Plan for Transition of Care is related to the following treatment goals of Acute upper GI bleeding [K92.2]    IF APPLICABLE: The Patient and/or patient representative Jose and his family were provided with a choice of provider and agrees with the discharge plan. Freedom of choice list with basic dialogue that supports the patient's individualized plan of care/goals and shares the quality data associated with the providers was provided to: (P) Patient   Patient Representative Name:       The Patient and/or Patient Representative Agree with the Discharge Plan? (P) Yes (plan is home)    ROB NARVAEZ RN  Case Management Department  Ph: [phone] Fax: [phone]

## 2024-08-10 NOTE — PLAN OF CARE
Problem: Discharge Planning  Goal: Discharge to home or other facility with appropriate resources  Outcome: Progressing     Problem: Skin/Tissue Integrity  Goal: Absence of new skin breakdown  Description: 1.  Monitor for areas of redness and/or skin breakdown  2.  Assess vascular access sites hourly  3.  Every 4-6 hours minimum:  Change oxygen saturation probe site  4.  Every 4-6 hours:  If on nasal continuous positive airway pressure, respiratory therapy assess nares and determine need for appliance change or resting period.  Outcome: Progressing     Problem: Safety - Adult  Goal: Free from fall injury  Outcome: Progressing     Problem: Respiratory - Adult  Goal: Achieves optimal ventilation and oxygenation  8/9/2024 2346 by Mercedez Bourne RN  Outcome: Progressing  8/9/2024 2256 by Selina Sow RCP  Outcome: Progressing

## 2024-08-10 NOTE — RT PROTOCOL NOTE
RT Inhaler-Nebulizer Bronchodilator Protocol Note    There is a bronchodilator order in the chart from a provider indicating to follow the RT Bronchodilator Protocol and there is an “Initiate RT Inhaler-Nebulizer Bronchodilator Protocol” order as well (see protocol at bottom of note).    CXR Findings:  No results found.    The findings from the last RT Protocol Assessment were as follows:   History Pulmonary Disease: Smoker 15 pack years or more  Respiratory Pattern: Regular pattern and RR 12-20 bpm  Breath Sounds: Inspiratory and expiratory or bilateral wheezing and/or rhonchi  Cough: Strong, spontaneous, non-productive  Indication for Bronchodilator Therapy: Wheezing associated with pulm disorder  Bronchodilator Assessment Score: 7    Aerosolized bronchodilator medication orders have been revised according to the RT Inhaler-Nebulizer Bronchodilator Protocol below.    Respiratory Therapist to perform RT Therapy Protocol Assessment initially then follow the protocol.  Repeat RT Therapy Protocol Assessment PRN for score 0-3 or on second treatment, BID, and PRN for scores above 3.    No Indications - adjust the frequency to every 6 hours PRN wheezing or bronchospasm, if no treatments needed after 48 hours then discontinue using Per Protocol order mode.     If indication present, adjust the RT bronchodilator orders based on the Bronchodilator Assessment Score as indicated below.  Use Inhaler orders unless patient has one or more of the following: on home nebulizer, not able to hold breath for 10 seconds, is not alert and oriented, cannot activate and use MDI correctly, or respiratory rate 25 breaths per minute or more, then use the equivalent nebulizer order(s) with same Frequency and PRN reasons based on the score.  If a patient is on this medication at home then do not decrease Frequency below that used at home.    0-3 - enter or revise RT bronchodilator order(s) to equivalent RT Bronchodilator order with Frequency

## 2024-08-10 NOTE — PROGRESS NOTES
1440: Paracentesis completed at bedside. 4800cc drained sample labeled and sent to Lab. VS remained stable throughout procedure.

## 2024-08-10 NOTE — ACP (ADVANCE CARE PLANNING)
Advance Care Planning     Advance Care Planning Inpatient Note  Spiritual Care Department    Today's Date: 8/10/2024  Unit: [facility] CAR 3- MICU    Received request from Other: Spiritual Care Consult . Upon review of chart and communication with care team, patient's decision making abilities are not in question. Patient was/were present in the room during visit.    Goals of ACP Conversation:  Discuss advance care planning documents    Health Care Decision Makers:    No healthcare decision makers have been documented.   Patient indicated that he would like his friend, Martina, named as his POA-H, however, he requested that paperwork be completed at a later time.   Summary:  No Decision Maker named by patient at this time    Advance Care Planning Documents (Patient Wishes):  None     Assessment:   visited patient per Spiritual Care Consult for Advance Directives. Patient was admitted to the hospital due to an \"Acute GI Bleed.\"  experienced patient as having \"labored breathing.\"     Interventions:  Provided education on documents for clarity and greater understanding  Encouraged ongoing ACP conversation with future decision makers and loved ones  Reviewed but did not complete ACP document    Care Preferences Communicated:   No    Outcomes/Plan:  ACP Discussion: Postponed    Electronically signed by TIMMY Avalos on 8/10/2024 at 2:19 AM

## 2024-08-10 NOTE — PROGRESS NOTES
Critical Care - Progress Note     Patient's name:  Jose Spence  Medical Record Number: [id number]  Patient's account/billing number: [id number]  Patient's YOB: 1960  Age: 64 y.o.  Date of Admission: 8/9/2024  5:27 PM  Reason of ICU admission:   Date of History and Physical Examination: 8/10/2024      Primary Care Physician: No primary care provider on file.  Attending Physician:    Code Status: Full Code    Chief complaint: Shortness of breath likely secondary to hepatopulmonary syndrome secondary to decompensated liver cirrhosis secondary to alcohol abuse    Reason for ICU admission:   Hemoglobin 5.4, active melena, 1 episode of hematemesis, abdominal ascites leading to respiratory distress    Interval History     Breathing on 2L NC today  Hemodynamically stable     Still experiencing tense ascites secondary to likely cirrhosis. Bedside paracentesis completed today. Received 3 x albumin over the past 24 hours    INR - 3.2. ordered vitamin K and FFP     Sodium 129. Repeat BMP pending    Patient hepatitis C ab positive- Hcv quantitative and genotype ordered    GI consulted     History Of Present Illness:   History was obtained from chart review and the patient.      Jose Spence is a 64 y.o. with unknown past medical history as he told us that he has not seen any physician in last 35 years came into the [facility] ICU due to worsening shortness of breath from huge abdominal tense ascites as a transfer from outlying facility.  Of note patient was admitted in [facility] on July 8 for worsening abdominal distention and bloating.  Upon further questioning that time patient stated he is persistently short of breath and has been gradually worsening.  Is been ongoing for last 5 months.  He reported her file and that he is having bloody stool that has been ongoing for last few month since March of this year however he has not seek any medical attention for that.  Patient drinks about 5-6  atelectasis with borderline enlargement of the main pulmonary artery with mural prominence throughout the thoracic esophagus      Last CT abdomen and pelvis with IV contrast showed large volume ascites with bilateral large pleural effusion with diffuse anasarca, cirrhotic liver morphology, portal vein patent, no large varices identified       Assessment and Plan     Patient Active Problem List   Diagnosis    Acute upper GI bleeding    Ascites due to alcoholic cirrhosis (HCC)       Plan:  Acute decompensated liver cirrhosis:  MELD 3.0: 34 at 8/10/2024  7:01 AM  MELD-Na: 33 at 8/10/2024  7:01 AM  Calculated from:  Serum Creatinine: 1.5 mg/dL at 8/10/2024  7:01 AM  Serum Sodium: 129 mmol/L at 8/10/2024  7:01 AM  Total Bilirubin: 8.1 mg/dL at 8/10/2024  7:01 AM  Serum Albumin: 2.6 g/dL at 8/10/2024  7:01 AM  INR(ratio): 3.2 at 8/10/2024  7:01 AM  Age at listing (hypothetical): 64 years  Sex: Male at 8/10/2024  7:01 AM     Likely secondary to alcohol  Complicated by huge abdominal tense ascites  Hematochezia likely secondary to rectal varix  GI consulted  Hepatitis C- ab positive. Quantitative and genotype ordered   Will start Protonix and octreotide infusion    Tense abdominal ascites with bilateral pleural effusion  - Likely secondary to decompensated liver cirrhosis resulting in hepatic hydrothorax versus hepatopulmonary syndrome  - CT chest from outlying facility revealed huge bilateral pleural effusion with compressive atelectasis  - IR guided paracentesis ordered and fluid study for IR guided para have been ordered, will follow-up with the result  - GI consulted  - lasix and aldactone held due to ELIO     Concern for SBP:  Currently afebrile, leukocyte count 12.2  Will start IV Levaquin 750 daily  Could not start IV Rocephin because of allergic reaction.  Bedside paracentesis ordered     Hematochezia/melena:  Likely secondary to bleeding from the portosystemic anastomosis in the rectum  GI consulted  Will continue

## 2024-08-10 NOTE — CONSENT
Informed Consent for Blood Component Transfusion Note    I have discussed with the patient the rationale for blood component transfusion; its benefits in treating or preventing fatigue, organ damage, or death; and its risk which includes mild transfusion reactions, rare risk of blood borne infection, or more serious but rare reactions. I have discussed the alternatives to transfusion, including the risk and consequences of not receiving transfusion. The patient had an opportunity to ask questions and had agreed to proceed with transfusion of blood components.    Electronically signed by Flo Diaz MD on 8/10/24 at 9:04 AM EDT

## 2024-08-10 NOTE — PROGRESS NOTES
Spiritual Health Assessment/Progress Note  [facility]    Advance Care Planning,    Name: Jose Spence MRN: [id number]    Age: 64 y.o.     Sex: male   Language: English   Synagogue: No Muslim on file   Acute upper GI bleeding     Date: 8/9/2024            Total Time Calculated: (P) 17 min              Spiritual Assessment began in [facility] CAR 3- MICU        Referral/Consult From: Multi-disciplinary team (Spiritual Care Consult)   Encounter Overview/Reason: Advance Care Planning  Service Provided For: Patient    Narrative:  visited patient per Spiritual Care Consult for Advance Directives. Patient declined completing the paperwork this evening, however, he mentioned his interest in completing paperwork tomorrow. Patient stated \"everybody says I'm dying.\"  shared words of comfort and encouragement.    Jane, Belief, Meaning:   Patient Other: patient did not disclose  Family/Friends No family/friends present      Importance and Influence:  Patient Other: patient did not disclose  Family/Friends no family/friends present    Community:  Patient Other:   Patient mentioned that a friend, Martina, will be visiting tomorrow. Per patient, she is who he would like to name as his POA-H.  Family/Friends Other: No family/friends present    Assessment and Plan of Care:     Patient Interventions include: Facilitated expression of thoughts and feelings, Affirmed coping skills/support systems, and Assisted in Advance Care Planning conversation  Family/Friends Interventions include: Other: No family/friends present    Patient Plan of Care: Spiritual Care available upon further referral  Family/Friends Plan of Care: Spiritual Care available upon further referral    Electronically signed by TIMMY Avalos on 8/9/2024 at 9:46 PM

## 2024-08-10 NOTE — PLAN OF CARE
Problem: Skin/Tissue Integrity  Goal: Absence of new skin breakdown  Description: 1.  Monitor for areas of redness and/or skin breakdown  2.  Assess vascular access sites hourly  3.  Every 4-6 hours minimum:  Change oxygen saturation probe site  4.  Every 4-6 hours:  If on nasal continuous positive airway pressure, respiratory therapy assess nares and determine need for appliance change or resting period.  8/10/2024 0941 by Andrei Ann RN  Outcome: Progressing     Problem: Safety - Adult  Goal: Free from fall injury  8/10/2024 0941 by Andrei Ann RN  Outcome: Progressing     Problem: Respiratory - Adult  Goal: Achieves optimal ventilation and oxygenation  8/10/2024 0941 by Andrei Ann, RN  Outcome: Progressing     Problem: Discharge Planning  Goal: Discharge to home or other facility with appropriate resources  8/10/2024 0941 by Andrei Ann, RN  Outcome: Progressing

## 2024-08-10 NOTE — CONSULTS
[facility]  Consultation Note     .  Chief Complaint:  Increased dyspnea  Increased abdominal distention    Reason for consult:    Decompensated cirrhosis with melena    History of present illness: This is a 64 y.o. male with unknown PMH as he has not seen a physician in over 35 years who originally presented to the hospital with increased dyspnea, increased abdominal distention present over the last few weeks.  Patient states that he has noticed increase in abdominal distention over the last few months but recently has become much worse, the distention is interfering with his eating and his breathing.  Patient also admits to intermittent melena over the last few months.   Patient is a chronic alcoholic consuming 5-6 beers daily and states that he stopped drinking July 6.  Patient continues to smoke half pack cigarettes daily/   Smokes marijuana daily, denies other street drug use  Patient denies any previous diagnosis of cirrhosis or cancer because he has not seen a doctor in many many years  Patient had initially been seen at OSH with a hemoglobin of 5.5 g/dL for which she received 2 units of PRBCs and then transferred to [facility].    Upon arrival his hemoglobin was 7.4 g/dL-and now 6.6 g/dL this a.m., WBCs 8.3, , lactic acid 2.9  LFTs show albumin of 2.6, AST 65, T. bili 7.1-8.1 with direct bili of 3.4, ammonia 54, INR 3-3.2  No rectal bleeding since admission  Per H&P note, patient had CT chest without contrast that showed large pleural effusion with pulmonary volume loss likely secondary to compressive atelectasis  CT abdomen and pelvis with contrast showed large volume ascites with bilateral large pleural effusions with diffuse anasarca, cirrhotic liver morphology and patent portal vein      Previous GI history:   None  No personal or family history of GI malignancy    Past Medical/Social/Family History:  See above  Previous records/history/ and notes were  distress-patient very short of breath most likely due to pulmonary fluid  Anasarca-patient with third spacing up to thighs, with pitting  Hyponatremia  Coagulopathy-patient received vitamin K    MELD 3.0: 34 at 8/10/2024  7:01 AM  MELD-Na: 33 at 8/10/2024  7:01 AM  Calculated from:  Serum Creatinine: 1.5 mg/dL at 8/10/2024  7:01 AM  Serum Sodium: 129 mmol/L at 8/10/2024  7:01 AM  Total Bilirubin: 8.1 mg/dL at 8/10/2024  7:01 AM  Serum Albumin: 2.6 g/dL at 8/10/2024  7:01 AM  INR(ratio): 3.2 at 8/10/2024  7:01 AM  Age at listing (hypothetical): 64 years  Sex: Male at 8/10/2024  7:01 AM      Recommendations and plan:    Liver ultrasound with Doppler to rule out portal vein thrombosis-May need to be completed after paracentesis  Recommend paracentesis with fluid analysis and albumin  Continue to monitor for active GI bleeding  Trend hemoglobin, transfuse as needed  Daily labs including CBC BMP LFT INR.  Additional liver serologies ordered  Continue current medical management  Agree with antibiotics  Continue PPI drip, continue octreotide drip  Once patient has had thoracentesis may consider endoscopy/colonoscopy  Monitor for alcohol withdrawal, recommend CIWA  Aspiration and fall precautions  Daily weight, tricked I's and O's  Assess for occult sepsis and treat accordingly  Avoid hepatotoxic agents, NSAIDs and narcotics if possible  Recommend nephrology consult given ELIO, possible need for diuresis in the setting of cirrhosis  Okay for few ice chips-please do not feed patient in case he he develops overt GI bleeding and requires urgent endoscopy      This plan was formulated in collaboration with Dr. Sheryl BECKER  Please feel free to contact us with any questions or concerns      [facility]  Bharath Nolen, PEGGY - CNP   [phone]  8/10/2024    11:58 AM     Estimated time of 80 mins reviewing chart, assessing patient and formulating plan of care    This note was created

## 2024-08-10 NOTE — PROCEDURES
PROCEDURE NOTE  Date: 8/10/2024   Name: Jose Spence  YOB: 1960    Paracentesis    Date/Time: 8/10/2024 6:05 PM    Performed by: Roni Medrano MD  Authorized by: Roni Medrano MD  Consent: Verbal consent obtained.  Risks and benefits: risks, benefits and alternatives were discussed  Consent given by: patient  Patient understanding: patient states understanding of the procedure being performed  Patient consent: the patient's understanding of the procedure matches consent given  Procedure consent: procedure consent matches procedure scheduled  Relevant documents: relevant documents present and verified  Test results: test results available and properly labeled  Site marked: the operative site was marked  Imaging studies: imaging studies available  Required items: required blood products, implants, devices, and special equipment available  Patient identity confirmed: verbally with patient  Time out: Immediately prior to procedure a \"time out\" was called to verify the correct patient, procedure, equipment, support staff and site/side marked as required.  Initial or subsequent exam: initial  Procedure purpose: diagnostic and therapeutic  Indications: abdominal discomfort secondary to ascites and new onset ascites  Anesthesia: local infiltration    Anesthesia:  Local Anesthetic: lidocaine 1% without epinephrine  Anesthetic total: 10 mL    Sedation:  Patient sedated: no    Preparation: Patient was prepped and draped in the usual sterile fashion.  Needle gauge: 5 F 7 cm.  Ultrasound guidance: yes  Puncture site: right lower quadrant  Fluid removed: 5000(ml)  Fluid appearance: serous  Dressing: 4x4 sterile gauze  Patient tolerance: patient tolerated the procedure well with no immediate complications           Procedure Note    Pre-operative Diagnosis: ascitis     Post-operative Diagnosis: same    Indications: Jose Spence is a 64 y.o. male with a  has no past medical history on file. found to have a

## 2024-08-11 PROBLEM — K72.90 DECOMPENSATION OF CIRRHOSIS OF LIVER (HCC): Status: ACTIVE | Noted: 2024-08-11

## 2024-08-11 PROBLEM — N17.9 AKI (ACUTE KIDNEY INJURY) (HCC): Status: ACTIVE | Noted: 2024-08-11

## 2024-08-11 PROBLEM — K74.60 DECOMPENSATION OF CIRRHOSIS OF LIVER (HCC): Status: ACTIVE | Noted: 2024-08-11

## 2024-08-11 PROBLEM — K76.82 HEPATIC ENCEPHALOPATHY (HCC): Status: ACTIVE | Noted: 2024-08-11

## 2024-08-11 LAB
ALBUMIN SERPL-MCNC: 2.4 G/DL (ref 3.5–5.2)
ALBUMIN/GLOB SERPL: 1 {RATIO} (ref 1–2.5)
ALP SERPL-CCNC: 50 U/L (ref 40–129)
ALT SERPL-CCNC: 18 U/L (ref 10–50)
ANION GAP SERPL CALCULATED.3IONS-SCNC: 7 MMOL/L (ref 9–16)
AST SERPL-CCNC: 44 U/L (ref 10–50)
BASOPHILS # BLD: 0 K/UL (ref 0–0.2)
BASOPHILS NFR BLD: 0 % (ref 0–2)
BILIRUB DIRECT SERPL-MCNC: 4 MG/DL (ref 0–0.2)
BILIRUB INDIRECT SERPL-MCNC: 5.1 MG/DL (ref 0–1)
BILIRUB SERPL-MCNC: 9.1 MG/DL (ref 0–1.2)
BLOOD BANK BLOOD PRODUCT EXPIRATION DATE: NORMAL
BLOOD BANK DISPENSE STATUS: NORMAL
BLOOD BANK ISBT PRODUCT BLOOD TYPE: 6200
BLOOD BANK PRODUCT CODE: NORMAL
BLOOD BANK UNIT TYPE AND RH: NORMAL
BPU ID: NORMAL
BUN SERPL-MCNC: 34 MG/DL (ref 8–23)
CALCIUM SERPL-MCNC: 8.3 MG/DL (ref 8.6–10.4)
CHLORIDE SERPL-SCNC: 102 MMOL/L (ref 98–107)
CO2 SERPL-SCNC: 21 MMOL/L (ref 20–31)
CO2 SERPL-SCNC: 21 MMOL/L (ref 20–31)
CO2 SERPL-SCNC: 22 MMOL/L (ref 20–31)
COMPONENT: NORMAL
CREAT SERPL-MCNC: 1.1 MG/DL (ref 0.7–1.2)
EOSINOPHIL # BLD: 0.23 K/UL (ref 0–0.44)
EOSINOPHILS RELATIVE PERCENT: 3 % (ref 1–4)
ERYTHROCYTE [DISTWIDTH] IN BLOOD BY AUTOMATED COUNT: 24 % (ref 11.8–14.4)
GFR, ESTIMATED: 79 ML/MIN/1.73M2
GLOBULIN SER CALC-MCNC: 3 G/DL
GLUCOSE SERPL-MCNC: 87 MG/DL (ref 74–99)
HCT VFR BLD AUTO: 20.9 % (ref 40.7–50.3)
HCT VFR BLD AUTO: 21 % (ref 40.7–50.3)
HCT VFR BLD AUTO: 25.2 % (ref 40.7–50.3)
HGB BLD-MCNC: 7 G/DL (ref 13–17)
HGB BLD-MCNC: 7.1 G/DL (ref 13–17)
HGB BLD-MCNC: 8.9 G/DL (ref 13–17)
IMM GRANULOCYTES # BLD AUTO: 0.08 K/UL (ref 0–0.3)
IMM GRANULOCYTES NFR BLD: 1 %
INR PPP: 2.9
LYMPHOCYTES NFR BLD: 1.29 K/UL (ref 1.1–3.7)
LYMPHOCYTES RELATIVE PERCENT: 17 % (ref 24–43)
MCH RBC QN AUTO: 35.7 PG (ref 25.2–33.5)
MCHC RBC AUTO-ENTMCNC: 33.5 G/DL (ref 28.4–34.8)
MCV RBC AUTO: 106.6 FL (ref 82.6–102.9)
MONOCYTES NFR BLD: 0.99 K/UL (ref 0.1–1.2)
MONOCYTES NFR BLD: 13 % (ref 3–12)
MORPHOLOGY: ABNORMAL
MORPHOLOGY: ABNORMAL
NEUTROPHILS NFR BLD: 66 % (ref 36–65)
NEUTS SEG NFR BLD: 5.01 K/UL (ref 1.5–8.1)
NRBC BLD-RTO: 0 PER 100 WBC
PLATELET # BLD AUTO: 63 K/UL (ref 138–453)
PMV BLD AUTO: 9.6 FL (ref 8.1–13.5)
POTASSIUM SERPL-SCNC: 4.1 MMOL/L (ref 3.7–5.3)
POTASSIUM SERPL-SCNC: 4.1 MMOL/L (ref 3.7–5.3)
POTASSIUM SERPL-SCNC: 4.7 MMOL/L (ref 3.7–5.3)
PROT SERPL-MCNC: 5.4 G/DL (ref 6.6–8.7)
PROTHROMBIN TIME: 29.5 SEC (ref 11.7–14.9)
RBC # BLD AUTO: 1.96 M/UL (ref 4.21–5.77)
SODIUM SERPL-SCNC: 130 MMOL/L (ref 136–145)
SODIUM SERPL-SCNC: 130 MMOL/L (ref 136–145)
SODIUM SERPL-SCNC: 131 MMOL/L (ref 136–145)
TRANSFUSION STATUS: NORMAL
UNIT DIVISION: 0
UNIT ISSUE DATE/TIME: NORMAL
WBC OTHER # BLD: 7.6 K/UL (ref 3.5–11.3)

## 2024-08-11 PROCEDURE — 6360000002 HC RX W HCPCS: Performed by: NURSE PRACTITIONER

## 2024-08-11 PROCEDURE — 6370000000 HC RX 637 (ALT 250 FOR IP)

## 2024-08-11 PROCEDURE — 85018 HEMOGLOBIN: CPT

## 2024-08-11 PROCEDURE — 2700000000 HC OXYGEN THERAPY PER DAY

## 2024-08-11 PROCEDURE — 2580000003 HC RX 258: Performed by: NURSE PRACTITIONER

## 2024-08-11 PROCEDURE — 80048 BASIC METABOLIC PNL TOTAL CA: CPT

## 2024-08-11 PROCEDURE — 2580000003 HC RX 258

## 2024-08-11 PROCEDURE — 94761 N-INVAS EAR/PLS OXIMETRY MLT: CPT

## 2024-08-11 PROCEDURE — 2000000000 HC ICU R&B

## 2024-08-11 PROCEDURE — 99233 SBSQ HOSP IP/OBS HIGH 50: CPT | Performed by: STUDENT IN AN ORGANIZED HEALTH CARE EDUCATION/TRAINING PROGRAM

## 2024-08-11 PROCEDURE — 85025 COMPLETE CBC W/AUTO DIFF WBC: CPT

## 2024-08-11 PROCEDURE — 6360000002 HC RX W HCPCS

## 2024-08-11 PROCEDURE — 6370000000 HC RX 637 (ALT 250 FOR IP): Performed by: STUDENT IN AN ORGANIZED HEALTH CARE EDUCATION/TRAINING PROGRAM

## 2024-08-11 PROCEDURE — 99291 CRITICAL CARE FIRST HOUR: CPT | Performed by: INTERNAL MEDICINE

## 2024-08-11 PROCEDURE — 80076 HEPATIC FUNCTION PANEL: CPT

## 2024-08-11 PROCEDURE — 36430 TRANSFUSION BLD/BLD COMPNT: CPT

## 2024-08-11 PROCEDURE — 87522 HEPATITIS C REVRS TRNSCRPJ: CPT

## 2024-08-11 PROCEDURE — 85610 PROTHROMBIN TIME: CPT

## 2024-08-11 PROCEDURE — 80051 ELECTROLYTE PANEL: CPT

## 2024-08-11 PROCEDURE — 85014 HEMATOCRIT: CPT

## 2024-08-11 PROCEDURE — 36415 COLL VENOUS BLD VENIPUNCTURE: CPT

## 2024-08-11 PROCEDURE — 87902 NFCT AGT GNTYP ALYS HEP C: CPT

## 2024-08-11 PROCEDURE — P9016 RBC LEUKOCYTES REDUCED: HCPCS

## 2024-08-11 RX ORDER — LORAZEPAM 0.5 MG/1
0.5 TABLET ORAL ONCE
Status: COMPLETED | OUTPATIENT
Start: 2024-08-11 | End: 2024-08-11

## 2024-08-11 RX ORDER — SODIUM CHLORIDE 9 MG/ML
INJECTION, SOLUTION INTRAVENOUS PRN
Status: DISCONTINUED | OUTPATIENT
Start: 2024-08-11 | End: 2024-08-13

## 2024-08-11 RX ORDER — SPIRONOLACTONE 25 MG/1
12.5 TABLET ORAL ONCE
Status: COMPLETED | OUTPATIENT
Start: 2024-08-11 | End: 2024-08-11

## 2024-08-11 RX ORDER — FUROSEMIDE 20 MG/1
20 TABLET ORAL ONCE
Status: DISCONTINUED | OUTPATIENT
Start: 2024-08-11 | End: 2024-08-12

## 2024-08-11 RX ORDER — LORAZEPAM 0.5 MG/1
0.5 TABLET ORAL EVERY 4 HOURS PRN
Status: DISCONTINUED | OUTPATIENT
Start: 2024-08-11 | End: 2024-08-15 | Stop reason: HOSPADM

## 2024-08-11 RX ADMIN — RIFAXIMIN 550 MG: 550 TABLET ORAL at 09:04

## 2024-08-11 RX ADMIN — OCTREOTIDE ACETATE 50 MCG/HR: 500 INJECTION, SOLUTION INTRAVENOUS; SUBCUTANEOUS at 00:13

## 2024-08-11 RX ADMIN — WATER 40 MG: 1 INJECTION INTRAMUSCULAR; INTRAVENOUS; SUBCUTANEOUS at 09:42

## 2024-08-11 RX ADMIN — LORAZEPAM 0.5 MG: 0.5 TABLET ORAL at 13:38

## 2024-08-11 RX ADMIN — FUROSEMIDE 20 MG: 20 TABLET ORAL at 12:51

## 2024-08-11 RX ADMIN — LORAZEPAM 0.5 MG: 0.5 TABLET ORAL at 09:41

## 2024-08-11 RX ADMIN — PANTOPRAZOLE SODIUM 8 MG/HR: 40 INJECTION, POWDER, FOR SOLUTION INTRAVENOUS at 00:13

## 2024-08-11 RX ADMIN — LACTULOSE 20 G: 20 SOLUTION ORAL at 12:21

## 2024-08-11 RX ADMIN — PANTOPRAZOLE SODIUM 8 MG/HR: 40 INJECTION, POWDER, FOR SOLUTION INTRAVENOUS at 12:23

## 2024-08-11 RX ADMIN — RIFAXIMIN 550 MG: 550 TABLET ORAL at 20:56

## 2024-08-11 RX ADMIN — PHYTONADIONE 10 MG: 10 INJECTION, EMULSION INTRAMUSCULAR; INTRAVENOUS; SUBCUTANEOUS at 17:06

## 2024-08-11 RX ADMIN — SODIUM CHLORIDE, PRESERVATIVE FREE 10 ML: 5 INJECTION INTRAVENOUS at 20:40

## 2024-08-11 RX ADMIN — OCTREOTIDE ACETATE 50 MCG/HR: 500 INJECTION, SOLUTION INTRAVENOUS; SUBCUTANEOUS at 11:18

## 2024-08-11 RX ADMIN — SPIRONOLACTONE 12.5 MG: 25 TABLET, FILM COATED ORAL at 15:37

## 2024-08-11 RX ADMIN — LACTULOSE 20 G: 20 SOLUTION ORAL at 09:04

## 2024-08-11 ASSESSMENT — PAIN DESCRIPTION - DESCRIPTORS
DESCRIPTORS: ACHING
DESCRIPTORS: ACHING

## 2024-08-11 ASSESSMENT — PAIN DESCRIPTION - LOCATION
LOCATION: LEG
LOCATION: LEG

## 2024-08-11 ASSESSMENT — PAIN DESCRIPTION - ORIENTATION
ORIENTATION: UPPER
ORIENTATION: UPPER

## 2024-08-11 ASSESSMENT — PAIN SCALES - GENERAL
PAINLEVEL_OUTOF10: 5
PAINLEVEL_OUTOF10: 0
PAINLEVEL_OUTOF10: 4

## 2024-08-11 NOTE — PLAN OF CARE
Problem: Discharge Planning  Goal: Discharge to home or other facility with appropriate resources  8/10/2024 2009 by Terri Corea, RN  Outcome: Progressing     Problem: Skin/Tissue Integrity  Goal: Absence of new skin breakdown  Description: 1.  Monitor for areas of redness and/or skin breakdown  2.  Assess vascular access sites hourly  3.  Every 4-6 hours minimum:  Change oxygen saturation probe site  4.  Every 4-6 hours:  If on nasal continuous positive airway pressure, respiratory therapy assess nares and determine need for appliance change or resting period.  8/10/2024 2009 by Terri Corea, RN  Outcome: Progressing     Problem: Safety - Adult  Goal: Free from fall injury  8/10/2024 2009 by Terri Corea RN  Outcome: Progressing     Problem: Respiratory - Adult  Goal: Achieves optimal ventilation and oxygenation  8/10/2024 2009 by Terri Corea, RN  Outcome: Progressing     Problem: ABCDS Injury Assessment  Goal: Absence of physical injury  Outcome: Progressing     Problem: Pain  Goal: Verbalizes/displays adequate comfort level or baseline comfort level  Outcome: Progressing

## 2024-08-11 NOTE — PROGRESS NOTES
[facility]   Gastroenterology Progress Note    Jose Spence is a 64 y.o. male patient.  Hospitalization Day:2      Chief consult reason:   Decompensated cirrhosis with melena     Subjective:  Patient seen and examined  No acute events overnight  Patient had bedside paracentesis done yesterday with 5 L removed, received albumin  This a.m. abdomen is less distended but still tolerant  Patient is extremely emotional this a.m., with multiple comments regarding death and dying  No rectal bleeding per staff overnight  Continues on drips  BUN improving from 40-34, hemoglobin with slight downward trending to 7.0 g/dL this a.m. most likely  delusional, platelets 63, INR 2.9  LFTs slightly worse today with T. bili 9.1 from 8.1    VITALS:  /64   Pulse 79   Temp 97.6 °F (36.4 °C)   Resp 14   Ht 1.905 m (6' 3\")   Wt 83.7 kg (184 lb 8.4 oz)   SpO2 100%   BMI 23.06 kg/m²   TEMPERATURE:  Current - Temp: 97.6 °F (36.4 °C); Max - Temp  Av.6 °F (36.4 °C)  Min: 97 °F (36.1 °C)  Max: 98 °F (36.7 °C)    Physical Assessment:  General appearance:  alert, cooperative and no distress  Mental Status:  oriented to person, place and time and normal affect  Lungs:  clear to auscultation bilaterally, normal effort  Heart:  regular rate and rhythm, no murmur  Abdomen:  soft, nontender, nondistended, normal bowel sounds, no masses, hepatomegaly, splenomegaly  Extremities:  no edema, redness, tenderness in the calves  Skin:  no gross lesions, rashes, induration    Data Review:    Labs and Imaging:     CBC:  Recent Labs     24  1824 08/10/24  0016 08/10/24  0701 08/10/24  1356 08/10/24  1849 24  0037 24  0708   WBC 12.2*  --  9.8  --   --   --  7.6   HGB 7.6*   < > 6.6* 7.5* 7.1* 7.1* 7.0*   .9*  --  108.4*  --   --   --  106.6*   RDW  --   --   --   --   --   --  24.0*   PLT See Reflexed IPF Result  --  See Reflexed IPF Result  --   --   --  63*    < > = values in this interval not

## 2024-08-11 NOTE — PLAN OF CARE
Problem: Discharge Planning  Goal: Discharge to home or other facility with appropriate resources  8/11/2024 0833 by Tasha Perales RN  Outcome: Progressing  8/10/2024 2009 by Terri Corea RN  Outcome: Progressing     Problem: Skin/Tissue Integrity  Goal: Absence of new skin breakdown  Description: 1.  Monitor for areas of redness and/or skin breakdown  2.  Assess vascular access sites hourly  3.  Every 4-6 hours minimum:  Change oxygen saturation probe site  4.  Every 4-6 hours:  If on nasal continuous positive airway pressure, respiratory therapy assess nares and determine need for appliance change or resting period.  8/11/2024 0833 by Tasha Perales RN  Outcome: Progressing  8/10/2024 2009 by Terri Corea RN  Outcome: Progressing     Problem: Safety - Adult  Goal: Free from fall injury  8/11/2024 0833 by Tasha Perales RN  Outcome: Progressing  8/10/2024 2009 by Terri Corea RN  Outcome: Progressing     Problem: Respiratory - Adult  Goal: Achieves optimal ventilation and oxygenation  8/11/2024 0833 by Tasha Perales RN  Outcome: Progressing  8/10/2024 2009 by Terri Corea RN  Outcome: Progressing     Problem: ABCDS Injury Assessment  Goal: Absence of physical injury  8/11/2024 0833 by Tasha Perales RN  Outcome: Progressing  8/10/2024 2009 by Terri Corea RN  Outcome: Progressing     Problem: Pain  Goal: Verbalizes/displays adequate comfort level or baseline comfort level  8/11/2024 0833 by Tasha Perales RN  Outcome: Progressing  8/10/2024 2009 by Terri Corea RN  Outcome: Progressing

## 2024-08-11 NOTE — PROGRESS NOTES
Advance Care Planning     Advance Care Planning Inpatient Note  Spiritual Care Department    Today's Date: 8/11/2024  Unit: [facility] 3- MICU    Received request from patient.  Upon review of chart and communication with care team, patient's decision making abilities are not in question.. Patient and Friends was/were present in the room during visit.    Goals of ACP Conversation:   helped pt and agent complete document.    Health Care Decision Makers:     No healthcare decision makers have been documented.    Summary:  Verified Documents  Completed New Documents    Advance Care Planning Documents (Patient Wishes):  Healthcare Power of /Advance Directive Appointment of Health Care Agent     Assessment:   was called to room to Provide help with completing POA.  provided support and prayer for Pt and friends.    Interventions:  Provided education on documents for clarity and greater understanding  Assisted in the completion of documents according to patient's wishes at this time    Care Preferences Communicated:   No    Outcomes/Plan:  New advance directive completed.  Returned original document(s) to patient, as well as copies for distribution to appointed agents  Copy of advance directive given to staff to scan into medical record.    Electronically signed by Chaplain ELLIOTT on 8/11/2024 at 7:26 PM            08/11/24 1923   Encounter Summary   Encounter Overview/Reason Advance Care Planning   Service Provided For Patient;Friend   Referral/Consult From Nurse   Support System Friends/neighbors   Last Encounter  08/09/24   Complexity of Encounter Moderate   Begin Time 1720   End Time  1750   Total Time Calculated 30 min   Spiritual/Emotional needs   Type Emotional Distress   Grief, Loss, and Adjustments   Type Life Adjustments;Adjustment to illness   Advance Care Planning   Type Completed AD/ACP document(s)   Assessment/Intervention/Outcome   Assessment Anxious;Coping    Intervention Active listening;Prayer (assurance of)/[place];Sustaining Presence/Ministry of presence   Outcome Coping;Encouraged   Plan and Referrals   Plan/Referrals Continue to visit, (comment)

## 2024-08-11 NOTE — PROGRESS NOTES
Critical Care - Progress Note     Patient's name:  Jose Spence  Medical Record Number: [id number]  Patient's account/billing number: [id number]  Patient's YOB: 1960  Age: 64 y.o.  Date of Admission: 8/9/2024  5:27 PM  Reason of ICU admission:   Date of History and Physical Examination: 8/11/2024      Primary Care Physician: No primary care provider on file.  Attending Physician:    Code Status: Full Code    Chief complaint: Shortness of breath likely secondary to hepatopulmonary syndrome secondary to decompensated liver cirrhosis secondary to alcohol abuse    Reason for ICU admission:   Hemoglobin 5.4, active melena, 1 episode of hematemesis, abdominal ascites leading to respiratory distress    Interval History     Remains on 2L NC  Hemodynamically stable    Patient is upset that he cannot eat food. Reminded him about the possible of varices.     Abdomen is still tense on exam. He is s/p paracentesis of 5L removed yesterday.     Repeat INR is 2.9. ordered another dose of vitamin K     Na- 130 this morning. Total bilirubin is 9.1 (direct bili 4.0/indirect 5.1 . Albumin 2.4. 7.0, receiving 1 unit.     Patient hepatitis C ab positive- Hcv quantitative and genotype ordered    GI consulted     History Of Present Illness:   History was obtained from chart review and the patient.      Jose Spence is a 64 y.o. with unknown past medical history as he told us that he has not seen any physician in last 35 years came into the [facility] ICU due to worsening shortness of breath from huge abdominal tense ascites as a transfer from outlying facility.  Of note patient was admitted in [facility] on July 8 for worsening abdominal distention and bloating.  Upon further questioning that time patient stated he is persistently short of breath and has been gradually worsening.  Is been ongoing for last 5 months.  He reported her file and that he is having bloody stool that has been ongoing for last

## 2024-08-12 ENCOUNTER — APPOINTMENT (OUTPATIENT)
Dept: GENERAL RADIOLOGY | Age: 64
DRG: 432 | End: 2024-08-12
Attending: STUDENT IN AN ORGANIZED HEALTH CARE EDUCATION/TRAINING PROGRAM

## 2024-08-12 ENCOUNTER — APPOINTMENT (OUTPATIENT)
Dept: ULTRASOUND IMAGING | Age: 64
DRG: 432 | End: 2024-08-12
Attending: STUDENT IN AN ORGANIZED HEALTH CARE EDUCATION/TRAINING PROGRAM

## 2024-08-12 ENCOUNTER — APPOINTMENT (OUTPATIENT)
Age: 64
DRG: 432 | End: 2024-08-12
Attending: STUDENT IN AN ORGANIZED HEALTH CARE EDUCATION/TRAINING PROGRAM

## 2024-08-12 PROBLEM — I42.9 CARDIOMYOPATHY (HCC): Status: ACTIVE | Noted: 2024-08-12

## 2024-08-12 LAB
ABO/RH: NORMAL
ALBUMIN SERPL-MCNC: 2.7 G/DL (ref 3.5–5.2)
ALBUMIN/GLOB SERPL: 1 {RATIO} (ref 1–2.5)
ALP SERPL-CCNC: 55 U/L (ref 40–129)
ALT SERPL-CCNC: 21 U/L (ref 10–50)
ANA SER QL IA: NEGATIVE
ANION GAP SERPL CALCULATED.3IONS-SCNC: 7 MMOL/L (ref 9–16)
ANTIBODY SCREEN: NEGATIVE
APPEARANCE FLD: NORMAL
ARM BAND NUMBER: NORMAL
AST SERPL-CCNC: 55 U/L (ref 10–50)
BASOPHILS # BLD: 0 K/UL (ref 0–0.2)
BASOPHILS NFR BLD: 0 % (ref 0–2)
BILIRUB DIRECT SERPL-MCNC: 4 MG/DL (ref 0–0.2)
BILIRUB INDIRECT SERPL-MCNC: 5.5 MG/DL (ref 0–1)
BILIRUB SERPL-MCNC: 9.5 MG/DL (ref 0–1.2)
BLOOD BANK BLOOD PRODUCT EXPIRATION DATE: NORMAL
BLOOD BANK BLOOD PRODUCT EXPIRATION DATE: NORMAL
BLOOD BANK DISPENSE STATUS: NORMAL
BLOOD BANK DISPENSE STATUS: NORMAL
BLOOD BANK ISBT PRODUCT BLOOD TYPE: 600
BLOOD BANK ISBT PRODUCT BLOOD TYPE: 600
BLOOD BANK PRODUCT CODE: NORMAL
BLOOD BANK PRODUCT CODE: NORMAL
BLOOD BANK SAMPLE EXPIRATION: NORMAL
BLOOD BANK UNIT TYPE AND RH: NORMAL
BLOOD BANK UNIT TYPE AND RH: NORMAL
BODY FLD TYPE: NORMAL
BPU ID: NORMAL
BPU ID: NORMAL
BUN SERPL-MCNC: 31 MG/DL (ref 8–23)
CALCIUM SERPL-MCNC: 8.6 MG/DL (ref 8.6–10.4)
CASE NUMBER:: NORMAL
CHLORIDE SERPL-SCNC: 102 MMOL/L (ref 98–107)
CLOT CHECK: NORMAL
CO2 SERPL-SCNC: 22 MMOL/L (ref 20–31)
COLOR FLD: NORMAL
COMPONENT: NORMAL
COMPONENT: NORMAL
CREAT SERPL-MCNC: 0.8 MG/DL (ref 0.7–1.2)
CROSSMATCH RESULT: NORMAL
CROSSMATCH RESULT: NORMAL
DSDNA IGG SER QL IA: 5.3 IU/ML
ECHO AO ROOT DIAM: 3.4 CM
ECHO AO ROOT INDEX: 1.58 CM/M2
ECHO AV AREA PEAK VELOCITY: 2.7 CM2
ECHO AV AREA VTI: 2.8 CM2
ECHO AV AREA/BSA PEAK VELOCITY: 1.3 CM2/M2
ECHO AV AREA/BSA VTI: 1.3 CM2/M2
ECHO AV MEAN GRADIENT: 5 MMHG
ECHO AV MEAN VELOCITY: 1 M/S
ECHO AV PEAK GRADIENT: 11 MMHG
ECHO AV PEAK VELOCITY: 1.7 M/S
ECHO AV VELOCITY RATIO: 0.82
ECHO AV VTI: 35.6 CM
ECHO EST RA PRESSURE: 3 MMHG
ECHO IVC PROX: 2.5 CM
ECHO LA AREA 2C: 21.6 CM2
ECHO LA AREA 4C: 22.6 CM2
ECHO LA DIAMETER INDEX: 2.09 CM/M2
ECHO LA DIAMETER: 4.5 CM
ECHO LA MAJOR AXIS: 5.9 CM
ECHO LA MINOR AXIS: 5.1 CM
ECHO LA TO AORTIC ROOT RATIO: 1.32
ECHO LA VOL BP: 74 ML (ref 18–58)
ECHO LA VOL MOD A2C: 73 ML (ref 18–58)
ECHO LA VOL MOD A4C: 65 ML (ref 18–58)
ECHO LA VOL/BSA BIPLANE: 34 ML/M2 (ref 16–34)
ECHO LA VOLUME INDEX MOD A2C: 34 ML/M2 (ref 16–34)
ECHO LA VOLUME INDEX MOD A4C: 30 ML/M2 (ref 16–34)
ECHO LV E' LATERAL VELOCITY: 13 CM/S
ECHO LV E' SEPTAL VELOCITY: 8 CM/S
ECHO LV EDV 3D: 171 ML
ECHO LV EDV INDEX 3D: 80 ML/M2
ECHO LV EJECTION FRACTION 3D: 61 %
ECHO LV ESV 3D: 66 ML
ECHO LV ESV INDEX 3D: 31 ML/M2
ECHO LV FRACTIONAL SHORTENING: 42 % (ref 28–44)
ECHO LV INTERNAL DIMENSION DIASTOLE INDEX: 2.88 CM/M2
ECHO LV INTERNAL DIMENSION DIASTOLIC: 6.2 CM (ref 4.2–5.9)
ECHO LV INTERNAL DIMENSION SYSTOLIC INDEX: 1.67 CM/M2
ECHO LV INTERNAL DIMENSION SYSTOLIC: 3.6 CM
ECHO LV IVSD: 0.6 CM (ref 0.6–1)
ECHO LV MASS 2D: 182.1 G (ref 88–224)
ECHO LV MASS 3D INDEX: 94 G/M2
ECHO LV MASS 3D: 202 G
ECHO LV MASS INDEX 2D: 84.7 G/M2 (ref 49–115)
ECHO LV POSTERIOR WALL DIASTOLIC: 0.9 CM (ref 0.6–1)
ECHO LV RELATIVE WALL THICKNESS RATIO: 0.29
ECHO LVOT AREA: 3.1 CM2
ECHO LVOT AV VTI INDEX: 0.9
ECHO LVOT DIAM: 2 CM
ECHO LVOT MEAN GRADIENT: 3 MMHG
ECHO LVOT PEAK GRADIENT: 8 MMHG
ECHO LVOT PEAK VELOCITY: 1.4 M/S
ECHO LVOT STROKE VOLUME INDEX: 46.6 ML/M2
ECHO LVOT SV: 100.2 ML
ECHO LVOT VTI: 31.9 CM
ECHO MV A VELOCITY: 0.61 M/S
ECHO MV AREA VTI: 3.7 CM2
ECHO MV E DECELERATION TIME (DT): 204 MS
ECHO MV E VELOCITY: 0.9 M/S
ECHO MV E/A RATIO: 1.48
ECHO MV E/E' LATERAL: 6.92
ECHO MV E/E' RATIO (AVERAGED): 9.09
ECHO MV E/E' SEPTAL: 11.25
ECHO MV LVOT VTI INDEX: 0.86
ECHO MV MAX VELOCITY: 0.9 M/S
ECHO MV MEAN GRADIENT: 2 MMHG
ECHO MV MEAN VELOCITY: 0.7 M/S
ECHO MV PEAK GRADIENT: 3 MMHG
ECHO MV VTI: 27.3 CM
ECHO PV MAX VELOCITY: 1 M/S
ECHO PV PEAK GRADIENT: 4 MMHG
ECHO RA AREA 4C: 23 CM2
ECHO RA END SYSTOLIC VOLUME APICAL 4 CHAMBER INDEX BSA: 34 ML/M2
ECHO RA VOLUME: 74 ML
ECHO RIGHT VENTRICULAR SYSTOLIC PRESSURE (RVSP): 29 MMHG
ECHO RV BASAL DIMENSION: 4.7 CM
ECHO RV FREE WALL PEAK S': 15 CM/S
ECHO RV TAPSE: 2.5 CM (ref 1.7–?)
ECHO TV REGURGITANT MAX VELOCITY: 2.57 M/S
ECHO TV REGURGITANT PEAK GRADIENT: 26 MMHG
EOSINOPHIL # BLD: 0 K/UL (ref 0–0.4)
EOSINOPHILS RELATIVE PERCENT: 0 % (ref 1–4)
ERYTHROCYTE [DISTWIDTH] IN BLOOD BY AUTOMATED COUNT: 22.5 % (ref 11.8–14.4)
GFR, ESTIMATED: >90 ML/MIN/1.73M2
GLOBULIN SER CALC-MCNC: 3.4 G/DL
GLUCOSE SERPL-MCNC: 135 MG/DL (ref 74–99)
HCT VFR BLD AUTO: 24.7 % (ref 40.7–50.3)
HCT VFR BLD AUTO: 25.9 % (ref 40.7–50.3)
HCV RNA # SERPL NAA+PROBE: DETECTED {COPIES}/ML
HCV RNA SERPL NAA+PROBE-ACNC: 2020 IU/ML
HCV RNA SERPL NAA+PROBE-LOG IU: 3.31 LOG IU/ML
HGB BLD-MCNC: 8 G/DL (ref 13–17)
HGB BLD-MCNC: 9.3 G/DL (ref 13–17)
IMM GRANULOCYTES # BLD AUTO: 0 K/UL (ref 0–0.3)
IMM GRANULOCYTES NFR BLD: 0 %
INR PPP: 2.2
INR PPP: 3.1
LDH FLD L TO P-CCNC: 124 U/L
LDH SERPL-CCNC: 486 U/L (ref 135–225)
LYMPHOCYTES NFR BLD: 0.32 K/UL (ref 1–4.8)
LYMPHOCYTES NFR FLD: 29 %
LYMPHOCYTES RELATIVE PERCENT: 4 % (ref 24–44)
MCH RBC QN AUTO: 35.8 PG (ref 25.2–33.5)
MCHC RBC AUTO-ENTMCNC: 35.9 G/DL (ref 28.4–34.8)
MCV RBC AUTO: 99.6 FL (ref 82.6–102.9)
MESOTHELIAL CELLS BODY FLUID: 7 %
MICROORGANISM/AGENT SPEC: NORMAL
MITOCHONDRIA M2 IGG SER-ACNC: 2.2 U/ML (ref 0–4)
MONOCYTES NFR BLD: 0.56 K/UL (ref 0.1–0.8)
MONOCYTES NFR BLD: 7 % (ref 1–7)
MONOCYTES NFR FLD: 43 %
MORPHOLOGY: ABNORMAL
NEUTROPHILS NFR BLD: 89 % (ref 36–66)
NEUTROPHILS NFR FLD: 21 %
NEUTS SEG NFR BLD: 7.12 K/UL (ref 1.8–7.7)
NRBC BLD-RTO: 0 PER 100 WBC
NUC CELL # FLD: 231 CELLS/UL
NUCLEAR IGG SER IA-RTO: 0.6 U/ML
PARTIAL THROMBOPLASTIN TIME: 47.5 SEC (ref 23–36.5)
PLATELET # BLD AUTO: ABNORMAL K/UL (ref 138–453)
PLATELET, FLUORESCENCE: 67 K/UL (ref 138–453)
PLATELETS.RETICULATED NFR BLD AUTO: 2.2 % (ref 1.1–10.3)
POTASSIUM SERPL-SCNC: 4.5 MMOL/L (ref 3.7–5.3)
PROT FLD-MCNC: 1.5 G/DL
PROT SERPL-MCNC: 5.5 G/DL (ref 6.6–8.7)
PROT SERPL-MCNC: 6.1 G/DL (ref 6.6–8.7)
PROTHROMBIN TIME: 24.3 SEC (ref 11.7–14.9)
PROTHROMBIN TIME: 31.3 SEC (ref 11.7–14.9)
RBC # BLD AUTO: 2.6 M/UL (ref 4.21–5.77)
RBC # FLD: NORMAL CELLS/UL
SODIUM SERPL-SCNC: 131 MMOL/L (ref 136–145)
SPECIMEN DESCRIPTION: NORMAL
SPECIMEN SOURCE: ABNORMAL
SPECIMEN TYPE: NORMAL
SPECIMEN TYPE: NORMAL
TRANSFUSION STATUS: NORMAL
TRANSFUSION STATUS: NORMAL
UNIT DIVISION: 0
UNIT DIVISION: 0
UNIT ISSUE DATE/TIME: NORMAL
UNIT ISSUE DATE/TIME: NORMAL
WBC OTHER # BLD: 8 K/UL (ref 3.5–11.3)

## 2024-08-12 PROCEDURE — 2000000000 HC ICU R&B

## 2024-08-12 PROCEDURE — P9047 ALBUMIN (HUMAN), 25%, 50ML: HCPCS | Performed by: RADIOLOGY

## 2024-08-12 PROCEDURE — 94761 N-INVAS EAR/PLS OXIMETRY MLT: CPT

## 2024-08-12 PROCEDURE — 93306 TTE W/DOPPLER COMPLETE: CPT

## 2024-08-12 PROCEDURE — 2709999900 US GUIDED PARACENTESIS

## 2024-08-12 PROCEDURE — 2709999900 US THORACENTESIS

## 2024-08-12 PROCEDURE — 85018 HEMOGLOBIN: CPT

## 2024-08-12 PROCEDURE — 0W9G3ZZ DRAINAGE OF PERITONEAL CAVITY, PERCUTANEOUS APPROACH: ICD-10-PCS | Performed by: RADIOLOGY

## 2024-08-12 PROCEDURE — 80076 HEPATIC FUNCTION PANEL: CPT

## 2024-08-12 PROCEDURE — 6360000002 HC RX W HCPCS: Performed by: NURSE PRACTITIONER

## 2024-08-12 PROCEDURE — 2700000000 HC OXYGEN THERAPY PER DAY

## 2024-08-12 PROCEDURE — 85025 COMPLETE CBC W/AUTO DIFF WBC: CPT

## 2024-08-12 PROCEDURE — 6360000002 HC RX W HCPCS: Performed by: RADIOLOGY

## 2024-08-12 PROCEDURE — 87205 SMEAR GRAM STAIN: CPT

## 2024-08-12 PROCEDURE — 6360000002 HC RX W HCPCS

## 2024-08-12 PROCEDURE — 86900 BLOOD TYPING SEROLOGIC ABO: CPT

## 2024-08-12 PROCEDURE — 71045 X-RAY EXAM CHEST 1 VIEW: CPT

## 2024-08-12 PROCEDURE — 85730 THROMBOPLASTIN TIME PARTIAL: CPT

## 2024-08-12 PROCEDURE — 2500000003 HC RX 250 WO HCPCS

## 2024-08-12 PROCEDURE — 89051 BODY FLUID CELL COUNT: CPT

## 2024-08-12 PROCEDURE — 93306 TTE W/DOPPLER COMPLETE: CPT | Performed by: INTERNAL MEDICINE

## 2024-08-12 PROCEDURE — 2580000003 HC RX 258

## 2024-08-12 PROCEDURE — 84155 ASSAY OF PROTEIN SERUM: CPT

## 2024-08-12 PROCEDURE — 86850 RBC ANTIBODY SCREEN: CPT

## 2024-08-12 PROCEDURE — 36415 COLL VENOUS BLD VENIPUNCTURE: CPT

## 2024-08-12 PROCEDURE — 36430 TRANSFUSION BLD/BLD COMPNT: CPT

## 2024-08-12 PROCEDURE — 85014 HEMATOCRIT: CPT

## 2024-08-12 PROCEDURE — 6370000000 HC RX 637 (ALT 250 FOR IP): Performed by: STUDENT IN AN ORGANIZED HEALTH CARE EDUCATION/TRAINING PROGRAM

## 2024-08-12 PROCEDURE — 86901 BLOOD TYPING SEROLOGIC RH(D): CPT

## 2024-08-12 PROCEDURE — 94640 AIRWAY INHALATION TREATMENT: CPT

## 2024-08-12 PROCEDURE — 6370000000 HC RX 637 (ALT 250 FOR IP)

## 2024-08-12 PROCEDURE — 86923 COMPATIBILITY TEST ELECTRIC: CPT

## 2024-08-12 PROCEDURE — 99233 SBSQ HOSP IP/OBS HIGH 50: CPT | Performed by: STUDENT IN AN ORGANIZED HEALTH CARE EDUCATION/TRAINING PROGRAM

## 2024-08-12 PROCEDURE — 84157 ASSAY OF PROTEIN OTHER: CPT

## 2024-08-12 PROCEDURE — 2580000003 HC RX 258: Performed by: NURSE PRACTITIONER

## 2024-08-12 PROCEDURE — 80048 BASIC METABOLIC PNL TOTAL CA: CPT

## 2024-08-12 PROCEDURE — 83615 LACTATE (LD) (LDH) ENZYME: CPT

## 2024-08-12 PROCEDURE — 86927 PLASMA FRESH FROZEN: CPT

## 2024-08-12 PROCEDURE — 99291 CRITICAL CARE FIRST HOUR: CPT | Performed by: INTERNAL MEDICINE

## 2024-08-12 PROCEDURE — 85055 RETICULATED PLATELET ASSAY: CPT

## 2024-08-12 PROCEDURE — 85610 PROTHROMBIN TIME: CPT

## 2024-08-12 PROCEDURE — P9017 PLASMA 1 DONOR FRZ W/IN 8 HR: HCPCS

## 2024-08-12 RX ORDER — SODIUM CHLORIDE 9 MG/ML
INJECTION, SOLUTION INTRAVENOUS PRN
Status: DISCONTINUED | OUTPATIENT
Start: 2024-08-12 | End: 2024-08-13

## 2024-08-12 RX ORDER — DEXTROSE MONOHYDRATE, SODIUM CHLORIDE, AND POTASSIUM CHLORIDE 50; 1.49; 4.5 G/1000ML; G/1000ML; G/1000ML
INJECTION, SOLUTION INTRAVENOUS CONTINUOUS
Status: DISCONTINUED | OUTPATIENT
Start: 2024-08-12 | End: 2024-08-12

## 2024-08-12 RX ORDER — SODIUM CHLORIDE 0.9 % (FLUSH) 0.9 %
5-40 SYRINGE (ML) INJECTION PRN
Status: DISCONTINUED | OUTPATIENT
Start: 2024-08-12 | End: 2024-08-15 | Stop reason: HOSPADM

## 2024-08-12 RX ORDER — ALBUTEROL SULFATE 2.5 MG/3ML
2.5 SOLUTION RESPIRATORY (INHALATION)
Status: DISCONTINUED | OUTPATIENT
Start: 2024-08-12 | End: 2024-08-14

## 2024-08-12 RX ORDER — SODIUM CHLORIDE 0.9 % (FLUSH) 0.9 %
5-40 SYRINGE (ML) INJECTION EVERY 12 HOURS SCHEDULED
Status: DISCONTINUED | OUTPATIENT
Start: 2024-08-12 | End: 2024-08-15 | Stop reason: HOSPADM

## 2024-08-12 RX ORDER — ALBUMIN (HUMAN) 12.5 G/50ML
75 SOLUTION INTRAVENOUS ONCE
Status: COMPLETED | OUTPATIENT
Start: 2024-08-12 | End: 2024-08-12

## 2024-08-12 RX ORDER — SPIRONOLACTONE 25 MG/1
25 TABLET ORAL DAILY
Status: DISCONTINUED | OUTPATIENT
Start: 2024-08-13 | End: 2024-08-15 | Stop reason: HOSPADM

## 2024-08-12 RX ORDER — SODIUM CHLORIDE 9 MG/ML
INJECTION, SOLUTION INTRAVENOUS PRN
Status: DISCONTINUED | OUTPATIENT
Start: 2024-08-12 | End: 2024-08-15 | Stop reason: HOSPADM

## 2024-08-12 RX ORDER — FUROSEMIDE 10 MG/ML
20 INJECTION INTRAMUSCULAR; INTRAVENOUS DAILY
Status: DISCONTINUED | OUTPATIENT
Start: 2024-08-12 | End: 2024-08-15 | Stop reason: HOSPADM

## 2024-08-12 RX ADMIN — OCTREOTIDE ACETATE 50 MCG/HR: 500 INJECTION, SOLUTION INTRAVENOUS; SUBCUTANEOUS at 17:31

## 2024-08-12 RX ADMIN — SODIUM CHLORIDE 125 MG: 9 INJECTION, SOLUTION INTRAVENOUS at 11:58

## 2024-08-12 RX ADMIN — WATER 40 MG: 1 INJECTION INTRAMUSCULAR; INTRAVENOUS; SUBCUTANEOUS at 09:10

## 2024-08-12 RX ADMIN — FUROSEMIDE 20 MG: 10 INJECTION, SOLUTION INTRAMUSCULAR; INTRAVENOUS at 10:25

## 2024-08-12 RX ADMIN — SPIRONOLACTONE 12.5 MG: 25 TABLET, FILM COATED ORAL at 09:10

## 2024-08-12 RX ADMIN — SODIUM CHLORIDE, PRESERVATIVE FREE 10 ML: 5 INJECTION INTRAVENOUS at 10:25

## 2024-08-12 RX ADMIN — ALBUTEROL SULFATE 2.5 MG: 2.5 SOLUTION RESPIRATORY (INHALATION) at 21:50

## 2024-08-12 RX ADMIN — SODIUM CHLORIDE, PRESERVATIVE FREE 10 ML: 5 INJECTION INTRAVENOUS at 20:41

## 2024-08-12 RX ADMIN — PANTOPRAZOLE SODIUM 8 MG/HR: 40 INJECTION, POWDER, FOR SOLUTION INTRAVENOUS at 01:03

## 2024-08-12 RX ADMIN — SODIUM CHLORIDE, PRESERVATIVE FREE 10 ML: 5 INJECTION INTRAVENOUS at 09:11

## 2024-08-12 RX ADMIN — RIFAXIMIN 550 MG: 550 TABLET ORAL at 09:10

## 2024-08-12 RX ADMIN — PANTOPRAZOLE SODIUM 8 MG/HR: 40 INJECTION, POWDER, FOR SOLUTION INTRAVENOUS at 16:42

## 2024-08-12 RX ADMIN — SODIUM CHLORIDE: 9 INJECTION, SOLUTION INTRAVENOUS at 05:52

## 2024-08-12 RX ADMIN — PHYTONADIONE 10 MG: 10 INJECTION, EMULSION INTRAMUSCULAR; INTRAVENOUS; SUBCUTANEOUS at 11:18

## 2024-08-12 RX ADMIN — OCTREOTIDE ACETATE 50 MCG/HR: 500 INJECTION, SOLUTION INTRAVENOUS; SUBCUTANEOUS at 01:04

## 2024-08-12 RX ADMIN — OCTREOTIDE ACETATE 50 MCG/HR: 500 INJECTION, SOLUTION INTRAVENOUS; SUBCUTANEOUS at 06:40

## 2024-08-12 RX ADMIN — RIFAXIMIN 550 MG: 550 TABLET ORAL at 20:41

## 2024-08-12 RX ADMIN — ALBUMIN (HUMAN) 75 G: 0.25 INJECTION, SOLUTION INTRAVENOUS at 17:33

## 2024-08-12 RX ADMIN — ALBUTEROL SULFATE 2.5 MG: 2.5 SOLUTION RESPIRATORY (INHALATION) at 11:17

## 2024-08-12 RX ADMIN — PANTOPRAZOLE SODIUM 8 MG/HR: 40 INJECTION, POWDER, FOR SOLUTION INTRAVENOUS at 06:40

## 2024-08-12 RX ADMIN — POTASSIUM CHLORIDE, DEXTROSE MONOHYDRATE AND SODIUM CHLORIDE: 150; 5; 450 INJECTION, SOLUTION INTRAVENOUS at 09:15

## 2024-08-12 ASSESSMENT — ENCOUNTER SYMPTOMS
ABDOMINAL DISTENTION: 1
COUGH: 1
ABDOMINAL PAIN: 1
CONSTIPATION: 0

## 2024-08-12 ASSESSMENT — PAIN SCALES - GENERAL
PAINLEVEL_OUTOF10: 0
PAINLEVEL_OUTOF10: 0

## 2024-08-12 NOTE — PLAN OF CARE
Problem: Discharge Planning  Goal: Discharge to home or other facility with appropriate resources  8/12/2024 0926 by Shaheen Hall RN  Outcome: Progressing  8/11/2024 2133 by Terri Corea RN  Outcome: Progressing     Problem: Skin/Tissue Integrity  Goal: Absence of new skin breakdown  Description: 1.  Monitor for areas of redness and/or skin breakdown  2.  Assess vascular access sites hourly  3.  Every 4-6 hours minimum:  Change oxygen saturation probe site  4.  Every 4-6 hours:  If on nasal continuous positive airway pressure, respiratory therapy assess nares and determine need for appliance change or resting period.  8/12/2024 0926 by Shaheen Hall RN  Outcome: Progressing  8/11/2024 2133 by Terri Corea RN  Outcome: Progressing     Problem: Safety - Adult  Goal: Free from fall injury  8/12/2024 0926 by Shaheen Hall RN  Outcome: Progressing  8/11/2024 2133 by Terri Corea RN  Outcome: Progressing     Problem: Respiratory - Adult  Goal: Achieves optimal ventilation and oxygenation  8/12/2024 0926 by Shaheen Hall RN  Outcome: Progressing  8/11/2024 2133 by Terri Corea RN  Outcome: Progressing     Problem: ABCDS Injury Assessment  Goal: Absence of physical injury  8/12/2024 0926 by Shaheen Hall RN  Outcome: Progressing  8/11/2024 2133 by Terri Corea RN  Outcome: Progressing     Problem: Pain  Goal: Verbalizes/displays adequate comfort level or baseline comfort level  8/12/2024 0926 by Shaheen Hall RN  Outcome: Progressing  8/11/2024 2133 by Terri Corea RN  Outcome: Progressing

## 2024-08-12 NOTE — PROGRESS NOTES
Critical Care - Progress Note     Patient's name:  Jose Spence  Medical Record Number: [id number]  Patient's account/billing number: [id number]  Patient's YOB: 1960  Age: 64 y.o.  Date of Admission: 8/9/2024  5:27 PM  Reason of ICU admission:   Date of History and Physical Examination: 8/12/2024      Primary Care Physician: No primary care provider on file.  Attending Physician:    Code Status: Full Code    Chief complaint: Shortness of breath likely secondary to hepatopulmonary syndrome secondary to decompensated liver cirrhosis secondary to alcohol abuse    Reason for ICU admission:   Hemoglobin 5.4, active melena, 1 episode of hematemesis, abdominal ascites leading to respiratory distress    Interval History     Still on Nasal cannula at 2L     Currently on CLD. E    EGD possibly tomorrow if patient has thoracentesis today     IR consulted for combined paracentesis and thoracentesis     Repeat INR is 2.1. two units FFP ordered     Na- 131 this morning. Total bilirubin is 9. (direct bili 4.0/indirect 5.5 . Albumin 2.7. hgb stable 8.9 -> 9.3    Patient hepatitis C ab positive- Hcv quantitative and genotype ordered. Pending     GI consulted - potential for EGD tomorrow. Patient doesn't seem to be having fast bleed if at all     History Of Present Illness:   History was obtained from chart review and the patient.      Jose Spence is a 64 y.o. with unknown past medical history as he told us that he has not seen any physician in last 35 years came into the [facility] ICU due to worsening shortness of breath from huge abdominal tense ascites as a transfer from outlying facility.  Of note patient was admitted in [facility] on July 8 for worsening abdominal distention and bloating.  Upon further questioning that time patient stated he is persistently short of breath and has been gradually worsening.  Is been ongoing for last 5 months.  He reported her file and that he is having

## 2024-08-12 NOTE — BRIEF OP NOTE
Brief Postoperative Note for Paracentesis and Thoracentesis    Jose Spence  YOB: 1960  [id number]    Pre-operative Diagnosis:  Ascites; Left Pleural effusion      Post-operative Diagnosis: Same    Procedure: Ultrasound guided Paracentesis; Thoracentesis     Anesthesia: 1% Lidocaine     Surgeons/Assistants: Jesus Espino PA-C    Complications: none    EBL: Minimal    Specimens: Were obtained    Ultrasound guided paracentesis performed. 7400 ml dark yellow fluid obtained.  Dressing applied.     Ultrasound guided left thoracentesis performed. 1000 ml dark pamela fluid obtained. Dressing applied.      Electronically signed by ALLA Alvarado on 8/12/2024 at 4:10 PM

## 2024-08-12 NOTE — CARE COORDINATION
TRansitional planning    Per notes needs endoscopy once INR has been corrected and patient has had thoracentesis. PT/OT ordered. Thoracentesis and paracentesis both ordered today to be done in IR.     1553 Patient not in room, off floor for procedure, unable to talk to about plan for discharge

## 2024-08-12 NOTE — PROGRESS NOTES
[place]   Gastroenterology Progress Note    Jose Spence is a 64 y.o. male patient.  Hospitalization Day:3      Chief consult reason:   Decompensated cirrhosis with melena     Subjective:  Patient seen and examined  No acute events overnight  Staff states that patient had at least 6 loose stools yesterday after clear liquid diet  Patient on lactulose, receiving Venofer,   BMP this a.m. shows sodium 131, BUN 31  LFTs relatively the same T. bili 9.5  Hemoglobin stable 9.2 g/dL, platelets stable 67  Still awaiting hepatitis C viral load and genotyping  Ceruloplasmin slightly low at 10, alpha 1 antitrypsin slightly low 79  Other serologies still pending    VITALS:  BP 99/70   Pulse 67   Temp 98.6 °F (37 °C) (Oral)   Resp 13   Ht 1.905 m (6' 3\")   Wt 86.8 kg (191 lb 5.8 oz)   SpO2 99%   BMI 23.92 kg/m²   TEMPERATURE:  Current - Temp: 98.6 °F (37 °C); Max - Temp  Av.8 °F (36.6 °C)  Min: 97.6 °F (36.4 °C)  Max: 98.6 °F (37 °C)    Physical Assessment:  General appearance:  alert, cooperative and mild pulmonary distress  Mental Status:  oriented to person, place and time and normal affect  Lungs: DMM bases  Heart:  regular rate and rhythm, no murmur  Abdomen:  soft, nontender, distended, normal bowel sounds, no masses, hepatomegaly, splenomegaly  Extremities:  no edema, redness, tenderness in the calves  Skin:  no gross lesions, rashes, induration    Data Review:    Labs and Imaging:     CBC:  Recent Labs     24  1824 08/10/24  0016 08/10/24  0701 08/10/24  1356 08/10/24  1849 24  0037 24  0708 24  1307 24  0256   WBC 12.2*  --  9.8  --   --   --  7.6  --  8.0   HGB 7.6*   < > 6.6*   < > 7.1* 7.1* 7.0* 8.9* 9.3*   .9*  --  108.4*  --   --   --  106.6*  --  99.6   RDW  --   --   --   --   --   --  24.0*  --  22.5*   PLT See Reflexed IPF Result  --  See Reflexed IPF Result  --   --   --  63*  --  See Reflexed IPF Result    < > = values in this interval

## 2024-08-12 NOTE — PLAN OF CARE
Problem: Discharge Planning  Goal: Discharge to home or other facility with appropriate resources  8/11/2024 2133 by Terri Corea RN  Outcome: Progressing     Problem: Skin/Tissue Integrity  Goal: Absence of new skin breakdown  Description: 1.  Monitor for areas of redness and/or skin breakdown  2.  Assess vascular access sites hourly  3.  Every 4-6 hours minimum:  Change oxygen saturation probe site  4.  Every 4-6 hours:  If on nasal continuous positive airway pressure, respiratory therapy assess nares and determine need for appliance change or resting period.  8/11/2024 2133 by Terri Corea RN  Outcome: Progressing     Problem: Safety - Adult  Goal: Free from fall injury  8/11/2024 2133 by Terri Corea RN  Outcome: Progressing     Problem: Respiratory - Adult  Goal: Achieves optimal ventilation and oxygenation  8/11/2024 2133 by Terri Corea RN  Outcome: Progressing     Problem: ABCDS Injury Assessment  Goal: Absence of physical injury  8/11/2024 2133 by Terri Corea RN  Outcome: Progressing     Problem: Pain  Goal: Verbalizes/displays adequate comfort level or baseline comfort level  8/11/2024 2133 by Terri Corea RN  Outcome: Progressing

## 2024-08-12 NOTE — FLOWSHEET NOTE
Thoracentesis: via LEFT chest AND PARACENTESIS     Dr. PORTILLO AND JR GOLD  MS RN  [facility]  [facility]    Patient to IR HOLDING AREA, identified, allergies confirmed. monitors on. Stable.   BP  111/64  HR  77  SAO2 97%    Time out complete. Prep to LEFT back PER JR GOLD.   1000 MLS of DARK ROBERT COLORED fluid drawn off. SAMPLE   SENT TO LAB PER WRITER  2x2 with Tegaderm to LEFT back puncture. No problems noted.     BP  104/65  HR  78  SATS  98%  Patient tolerated well. Post CXR ordered and completed. Patient stable,     SET UP FOR PARACENTESIS  Identified, allergies confirmed  Supine position  Stable  Prep to abd.    7400 MLS  of DARK YELLOW colored fluid drawn off.   2x2 with Tegaderm to puncture site. ALBUMIN ORDERED TO BE GIVEN ON FLOOR.  NO SAMPLE ORDERED FOR PARACENTESIS.    BP 99/59  HR 73  SAO2 100 %    Patient tolerated well. No problems noted.   RN TO TRANSPORT PT BACK TO ICU ON MONITOR

## 2024-08-12 NOTE — PLAN OF CARE
Problem: Respiratory - Adult  Goal: Achieves optimal ventilation and oxygenation  8/12/2024 1014 by Allison Dial, DAWSON  Outcome: Progressing   BRONCHOSPASM/BRONCHOCONSTRICTION     [x]         IMPROVE AERATION/BREATH SOUNDS  [x]   ADMINISTER BRONCHODILATOR THERAPY AS APPROPRIATE  [x]   ASSESS BREATH SOUNDS  []   IMPLEMENT AEROSOL/MDI PROTOCOL  [x]   PATIENT EDUCATION AS NEEDED

## 2024-08-12 NOTE — PLAN OF CARE
Problem: Respiratory - Adult  Goal: Achieves optimal ventilation and oxygenation  8/12/2024 1010 by Allison Dial, DAWSON  Outcome: Progressing   BRONCHOSPASM/BRONCHOCONSTRICTION     [x]         IMPROVE AERATION/BREATH SOUNDS  [x]   ADMINISTER BRONCHODILATOR THERAPY AS APPROPRIATE  [x]   ASSESS BREATH SOUNDS  []   IMPLEMENT AEROSOL/MDI PROTOCOL  [x]   PATIENT EDUCATION AS NEEDED

## 2024-08-13 ENCOUNTER — ANESTHESIA (OUTPATIENT)
Dept: OPERATING ROOM | Age: 64
End: 2024-08-13

## 2024-08-13 ENCOUNTER — ANESTHESIA EVENT (OUTPATIENT)
Dept: OPERATING ROOM | Age: 64
End: 2024-08-13

## 2024-08-13 LAB
ANION GAP SERPL CALCULATED.3IONS-SCNC: 10 MMOL/L (ref 9–16)
BASOPHILS # BLD: 0 K/UL (ref 0–0.2)
BASOPHILS NFR BLD: 0 % (ref 0–2)
BLOOD BANK BLOOD PRODUCT EXPIRATION DATE: NORMAL
BLOOD BANK DISPENSE STATUS: NORMAL
BLOOD BANK ISBT PRODUCT BLOOD TYPE: 6200
BLOOD BANK PRODUCT CODE: NORMAL
BLOOD BANK UNIT TYPE AND RH: NORMAL
BPU ID: NORMAL
BUN SERPL-MCNC: 26 MG/DL (ref 8–23)
CALCIUM SERPL-MCNC: 9 MG/DL (ref 8.6–10.4)
CHLORIDE SERPL-SCNC: 102 MMOL/L (ref 98–107)
CO2 SERPL-SCNC: 21 MMOL/L (ref 20–31)
COMPONENT: NORMAL
CREAT SERPL-MCNC: 0.7 MG/DL (ref 0.7–1.2)
EOSINOPHIL # BLD: 0 K/UL (ref 0–0.44)
EOSINOPHILS RELATIVE PERCENT: 0 % (ref 1–4)
ERYTHROCYTE [DISTWIDTH] IN BLOOD BY AUTOMATED COUNT: 22.5 % (ref 11.8–14.4)
GFR, ESTIMATED: >90 ML/MIN/1.73M2
GLUCOSE SERPL-MCNC: 138 MG/DL (ref 74–99)
HCT VFR BLD AUTO: 21.4 % (ref 40.7–50.3)
HCT VFR BLD AUTO: 23.8 % (ref 40.7–50.3)
HCT VFR BLD AUTO: 25 % (ref 40.7–50.3)
HGB BLD-MCNC: 7 G/DL (ref 13–17)
HGB BLD-MCNC: 7.9 G/DL (ref 13–17)
HGB BLD-MCNC: 8 G/DL (ref 13–17)
IMM GRANULOCYTES # BLD AUTO: 0 K/UL (ref 0–0.3)
IMM GRANULOCYTES NFR BLD: 0 %
INR PPP: 2.1
INR PPP: 2.2
LYMPHOCYTES NFR BLD: 0.82 K/UL (ref 1.1–3.7)
LYMPHOCYTES RELATIVE PERCENT: 10 % (ref 24–43)
MCH RBC QN AUTO: 35.2 PG (ref 25.2–33.5)
MCHC RBC AUTO-ENTMCNC: 32.7 G/DL (ref 28.4–34.8)
MCV RBC AUTO: 107.5 FL (ref 82.6–102.9)
MICROORGANISM/AGENT SPEC: NORMAL
MONOCYTES NFR BLD: 0.98 K/UL (ref 0.1–1.2)
MONOCYTES NFR BLD: 12 % (ref 3–12)
MORPHOLOGY: ABNORMAL
NEUTROPHILS NFR BLD: 78 % (ref 36–65)
NEUTS SEG NFR BLD: 6.4 K/UL (ref 1.5–8.1)
NRBC BLD-RTO: 0 PER 100 WBC
PLATELET # BLD AUTO: 54 K/UL (ref 138–453)
PMV BLD AUTO: 9.4 FL (ref 8.1–13.5)
POTASSIUM SERPL-SCNC: 4 MMOL/L (ref 3.7–5.3)
PROTHROMBIN TIME: 23.4 SEC (ref 11.7–14.9)
PROTHROMBIN TIME: 23.5 SEC (ref 11.7–14.9)
RBC # BLD AUTO: 1.99 M/UL (ref 4.21–5.77)
SERVICE CMNT-IMP: NORMAL
SMOOTH MUSCLE ANTIBODY: 17 UNITS (ref 0–19)
SODIUM SERPL-SCNC: 133 MMOL/L (ref 136–145)
SPECIMEN DESCRIPTION: NORMAL
SURGICAL PATHOLOGY REPORT: NORMAL
TRANSFUSION STATUS: NORMAL
UNIT DIVISION: 0
UNIT ISSUE DATE/TIME: NORMAL
WBC OTHER # BLD: 8.2 K/UL (ref 3.5–11.3)

## 2024-08-13 PROCEDURE — 6360000002 HC RX W HCPCS: Performed by: STUDENT IN AN ORGANIZED HEALTH CARE EDUCATION/TRAINING PROGRAM

## 2024-08-13 PROCEDURE — 0DB68ZX EXCISION OF STOMACH, VIA NATURAL OR ARTIFICIAL OPENING ENDOSCOPIC, DIAGNOSTIC: ICD-10-PCS | Performed by: STUDENT IN AN ORGANIZED HEALTH CARE EDUCATION/TRAINING PROGRAM

## 2024-08-13 PROCEDURE — 80048 BASIC METABOLIC PNL TOTAL CA: CPT

## 2024-08-13 PROCEDURE — P9073 PLATELETS PHERESIS PATH REDU: HCPCS

## 2024-08-13 PROCEDURE — 94640 AIRWAY INHALATION TREATMENT: CPT

## 2024-08-13 PROCEDURE — 2580000003 HC RX 258: Performed by: STUDENT IN AN ORGANIZED HEALTH CARE EDUCATION/TRAINING PROGRAM

## 2024-08-13 PROCEDURE — 7100000000 HC PACU RECOVERY - FIRST 15 MIN: Performed by: STUDENT IN AN ORGANIZED HEALTH CARE EDUCATION/TRAINING PROGRAM

## 2024-08-13 PROCEDURE — 43244 EGD VARICES LIGATION: CPT | Performed by: STUDENT IN AN ORGANIZED HEALTH CARE EDUCATION/TRAINING PROGRAM

## 2024-08-13 PROCEDURE — P9017 PLASMA 1 DONOR FRZ W/IN 8 HR: HCPCS

## 2024-08-13 PROCEDURE — 3700000001 HC ADD 15 MINUTES (ANESTHESIA): Performed by: STUDENT IN AN ORGANIZED HEALTH CARE EDUCATION/TRAINING PROGRAM

## 2024-08-13 PROCEDURE — 6370000000 HC RX 637 (ALT 250 FOR IP): Performed by: STUDENT IN AN ORGANIZED HEALTH CARE EDUCATION/TRAINING PROGRAM

## 2024-08-13 PROCEDURE — 2580000003 HC RX 258: Performed by: NURSE PRACTITIONER

## 2024-08-13 PROCEDURE — 2580000003 HC RX 258

## 2024-08-13 PROCEDURE — 1200000000 HC SEMI PRIVATE

## 2024-08-13 PROCEDURE — 2720000010 HC SURG SUPPLY STERILE: Performed by: STUDENT IN AN ORGANIZED HEALTH CARE EDUCATION/TRAINING PROGRAM

## 2024-08-13 PROCEDURE — 2500000003 HC RX 250 WO HCPCS

## 2024-08-13 PROCEDURE — 36430 TRANSFUSION BLD/BLD COMPNT: CPT

## 2024-08-13 PROCEDURE — 88305 TISSUE EXAM BY PATHOLOGIST: CPT

## 2024-08-13 PROCEDURE — 87206 SMEAR FLUORESCENT/ACID STAI: CPT

## 2024-08-13 PROCEDURE — 6360000002 HC RX W HCPCS

## 2024-08-13 PROCEDURE — 87102 FUNGUS ISOLATION CULTURE: CPT

## 2024-08-13 PROCEDURE — 88312 SPECIAL STAINS GROUP 1: CPT

## 2024-08-13 PROCEDURE — 2700000000 HC OXYGEN THERAPY PER DAY

## 2024-08-13 PROCEDURE — 6360000002 HC RX W HCPCS: Performed by: NURSE PRACTITIONER

## 2024-08-13 PROCEDURE — 93005 ELECTROCARDIOGRAM TRACING: CPT

## 2024-08-13 PROCEDURE — 87106 FUNGI IDENTIFICATION YEAST: CPT

## 2024-08-13 PROCEDURE — 0DB58ZX EXCISION OF ESOPHAGUS, VIA NATURAL OR ARTIFICIAL OPENING ENDOSCOPIC, DIAGNOSTIC: ICD-10-PCS | Performed by: STUDENT IN AN ORGANIZED HEALTH CARE EDUCATION/TRAINING PROGRAM

## 2024-08-13 PROCEDURE — 43239 EGD BIOPSY SINGLE/MULTIPLE: CPT | Performed by: STUDENT IN AN ORGANIZED HEALTH CARE EDUCATION/TRAINING PROGRAM

## 2024-08-13 PROCEDURE — 85610 PROTHROMBIN TIME: CPT

## 2024-08-13 PROCEDURE — 85014 HEMATOCRIT: CPT

## 2024-08-13 PROCEDURE — 85025 COMPLETE CBC W/AUTO DIFF WBC: CPT

## 2024-08-13 PROCEDURE — 3609013000 HC EGD TRANSORAL CONTROL BLEEDING ANY METHOD: Performed by: STUDENT IN AN ORGANIZED HEALTH CARE EDUCATION/TRAINING PROGRAM

## 2024-08-13 PROCEDURE — 94761 N-INVAS EAR/PLS OXIMETRY MLT: CPT

## 2024-08-13 PROCEDURE — 3700000000 HC ANESTHESIA ATTENDED CARE: Performed by: STUDENT IN AN ORGANIZED HEALTH CARE EDUCATION/TRAINING PROGRAM

## 2024-08-13 PROCEDURE — 86927 PLASMA FRESH FROZEN: CPT

## 2024-08-13 PROCEDURE — 3609012400 HC EGD TRANSORAL BIOPSY SINGLE/MULTIPLE: Performed by: STUDENT IN AN ORGANIZED HEALTH CARE EDUCATION/TRAINING PROGRAM

## 2024-08-13 PROCEDURE — 36415 COLL VENOUS BLD VENIPUNCTURE: CPT

## 2024-08-13 PROCEDURE — 7100000001 HC PACU RECOVERY - ADDTL 15 MIN: Performed by: STUDENT IN AN ORGANIZED HEALTH CARE EDUCATION/TRAINING PROGRAM

## 2024-08-13 PROCEDURE — 99291 CRITICAL CARE FIRST HOUR: CPT | Performed by: INTERNAL MEDICINE

## 2024-08-13 PROCEDURE — 88342 IMHCHEM/IMCYTCHM 1ST ANTB: CPT

## 2024-08-13 PROCEDURE — 06L38CZ OCCLUSION OF ESOPHAGEAL VEIN WITH EXTRALUMINAL DEVICE, VIA NATURAL OR ARTIFICIAL OPENING ENDOSCOPIC: ICD-10-PCS | Performed by: STUDENT IN AN ORGANIZED HEALTH CARE EDUCATION/TRAINING PROGRAM

## 2024-08-13 PROCEDURE — 85018 HEMOGLOBIN: CPT

## 2024-08-13 PROCEDURE — 2709999900 HC NON-CHARGEABLE SUPPLY: Performed by: STUDENT IN AN ORGANIZED HEALTH CARE EDUCATION/TRAINING PROGRAM

## 2024-08-13 PROCEDURE — P9016 RBC LEUKOCYTES REDUCED: HCPCS

## 2024-08-13 RX ORDER — FLUCONAZOLE 2 MG/ML
200 INJECTION, SOLUTION INTRAVENOUS EVERY 24 HOURS
Status: DISCONTINUED | OUTPATIENT
Start: 2024-08-13 | End: 2024-08-13

## 2024-08-13 RX ORDER — LIDOCAINE HYDROCHLORIDE 10 MG/ML
1 INJECTION, SOLUTION EPIDURAL; INFILTRATION; INTRACAUDAL; PERINEURAL
Status: CANCELLED | OUTPATIENT
Start: 2024-08-13 | End: 2024-08-14

## 2024-08-13 RX ORDER — PROPOFOL 10 MG/ML
INJECTION, EMULSION INTRAVENOUS CONTINUOUS PRN
Status: DISCONTINUED | OUTPATIENT
Start: 2024-08-13 | End: 2024-08-13 | Stop reason: SDUPTHER

## 2024-08-13 RX ORDER — LIDOCAINE HYDROCHLORIDE 10 MG/ML
INJECTION, SOLUTION EPIDURAL; INFILTRATION; INTRACAUDAL; PERINEURAL PRN
Status: DISCONTINUED | OUTPATIENT
Start: 2024-08-13 | End: 2024-08-13 | Stop reason: SDUPTHER

## 2024-08-13 RX ORDER — SODIUM CHLORIDE 9 MG/ML
INJECTION, SOLUTION INTRAVENOUS PRN
Status: DISCONTINUED | OUTPATIENT
Start: 2024-08-13 | End: 2024-08-15 | Stop reason: HOSPADM

## 2024-08-13 RX ORDER — SODIUM CHLORIDE 0.9 % (FLUSH) 0.9 %
5-40 SYRINGE (ML) INJECTION PRN
Status: CANCELLED | OUTPATIENT
Start: 2024-08-13

## 2024-08-13 RX ORDER — SODIUM CHLORIDE 9 MG/ML
INJECTION, SOLUTION INTRAVENOUS CONTINUOUS PRN
Status: DISCONTINUED | OUTPATIENT
Start: 2024-08-13 | End: 2024-08-13 | Stop reason: SDUPTHER

## 2024-08-13 RX ORDER — SODIUM CHLORIDE, SODIUM LACTATE, POTASSIUM CHLORIDE, CALCIUM CHLORIDE 600; 310; 30; 20 MG/100ML; MG/100ML; MG/100ML; MG/100ML
INJECTION, SOLUTION INTRAVENOUS CONTINUOUS PRN
Status: DISCONTINUED | OUTPATIENT
Start: 2024-08-13 | End: 2024-08-13

## 2024-08-13 RX ORDER — FLUCONAZOLE 2 MG/ML
200 INJECTION, SOLUTION INTRAVENOUS EVERY 24 HOURS
Status: DISCONTINUED | OUTPATIENT
Start: 2024-08-14 | End: 2024-08-15 | Stop reason: HOSPADM

## 2024-08-13 RX ORDER — MIDAZOLAM HYDROCHLORIDE 2 MG/2ML
1 INJECTION, SOLUTION INTRAMUSCULAR; INTRAVENOUS EVERY 10 MIN PRN
Status: CANCELLED | OUTPATIENT
Start: 2024-08-13

## 2024-08-13 RX ORDER — SODIUM CHLORIDE 9 MG/ML
INJECTION, SOLUTION INTRAVENOUS PRN
Status: CANCELLED | OUTPATIENT
Start: 2024-08-13

## 2024-08-13 RX ORDER — SODIUM CHLORIDE 0.9 % (FLUSH) 0.9 %
5-40 SYRINGE (ML) INJECTION EVERY 12 HOURS SCHEDULED
Status: CANCELLED | OUTPATIENT
Start: 2024-08-13

## 2024-08-13 RX ORDER — FLUCONAZOLE 2 MG/ML
400 INJECTION, SOLUTION INTRAVENOUS ONCE
Status: COMPLETED | OUTPATIENT
Start: 2024-08-13 | End: 2024-08-13

## 2024-08-13 RX ADMIN — RIFAXIMIN 550 MG: 550 TABLET ORAL at 20:21

## 2024-08-13 RX ADMIN — PROPOFOL 20 MG: 10 INJECTION, EMULSION INTRAVENOUS at 08:03

## 2024-08-13 RX ADMIN — PROPOFOL 20 MG: 10 INJECTION, EMULSION INTRAVENOUS at 07:50

## 2024-08-13 RX ADMIN — SODIUM CHLORIDE: 9 INJECTION, SOLUTION INTRAVENOUS at 04:56

## 2024-08-13 RX ADMIN — PHYTONADIONE 10 MG: 10 INJECTION, EMULSION INTRAMUSCULAR; INTRAVENOUS; SUBCUTANEOUS at 11:40

## 2024-08-13 RX ADMIN — OCTREOTIDE ACETATE 50 MCG/HR: 500 INJECTION, SOLUTION INTRAVENOUS; SUBCUTANEOUS at 01:55

## 2024-08-13 RX ADMIN — ALBUTEROL SULFATE 2.5 MG: 2.5 SOLUTION RESPIRATORY (INHALATION) at 12:08

## 2024-08-13 RX ADMIN — FLUCONAZOLE 400 MG: 400 INJECTION, SOLUTION INTRAVENOUS at 17:02

## 2024-08-13 RX ADMIN — PHYTONADIONE 10 MG: 10 INJECTION, EMULSION INTRAMUSCULAR; INTRAVENOUS; SUBCUTANEOUS at 04:56

## 2024-08-13 RX ADMIN — SODIUM CHLORIDE: 9 INJECTION, SOLUTION INTRAVENOUS at 07:40

## 2024-08-13 RX ADMIN — RIFAXIMIN 550 MG: 550 TABLET ORAL at 09:31

## 2024-08-13 RX ADMIN — FUROSEMIDE 20 MG: 10 INJECTION, SOLUTION INTRAMUSCULAR; INTRAVENOUS at 09:31

## 2024-08-13 RX ADMIN — OCTREOTIDE ACETATE 50 MCG/HR: 500 INJECTION, SOLUTION INTRAVENOUS; SUBCUTANEOUS at 14:06

## 2024-08-13 RX ADMIN — LIDOCAINE HYDROCHLORIDE 50 MG: 10 INJECTION, SOLUTION EPIDURAL; INFILTRATION; INTRACAUDAL; PERINEURAL at 07:46

## 2024-08-13 RX ADMIN — OXYCODONE HYDROCHLORIDE 5 MG: 5 TABLET ORAL at 09:32

## 2024-08-13 RX ADMIN — LACTULOSE 20 G: 20 SOLUTION ORAL at 09:31

## 2024-08-13 RX ADMIN — PANTOPRAZOLE SODIUM 8 MG/HR: 40 INJECTION, POWDER, FOR SOLUTION INTRAVENOUS at 12:18

## 2024-08-13 RX ADMIN — SODIUM CHLORIDE, PRESERVATIVE FREE 10 ML: 5 INJECTION INTRAVENOUS at 20:23

## 2024-08-13 RX ADMIN — LACTULOSE 20 G: 20 SOLUTION ORAL at 20:23

## 2024-08-13 RX ADMIN — LACTULOSE 20 G: 20 SOLUTION ORAL at 14:45

## 2024-08-13 RX ADMIN — PANTOPRAZOLE SODIUM 8 MG/HR: 40 INJECTION, POWDER, FOR SOLUTION INTRAVENOUS at 22:21

## 2024-08-13 RX ADMIN — PROPOFOL 100 MCG/KG/MIN: 10 INJECTION, EMULSION INTRAVENOUS at 07:46

## 2024-08-13 RX ADMIN — PROPOFOL 20 MG: 10 INJECTION, EMULSION INTRAVENOUS at 07:47

## 2024-08-13 RX ADMIN — PANTOPRAZOLE SODIUM 8 MG/HR: 40 INJECTION, POWDER, FOR SOLUTION INTRAVENOUS at 00:41

## 2024-08-13 ASSESSMENT — ENCOUNTER SYMPTOMS
ABDOMINAL DISTENTION: 1
CONSTIPATION: 0
ABDOMINAL PAIN: 1
COUGH: 1

## 2024-08-13 ASSESSMENT — PAIN SCALES - GENERAL
PAINLEVEL_OUTOF10: 7
PAINLEVEL_OUTOF10: 0
PAINLEVEL_OUTOF10: 0

## 2024-08-13 ASSESSMENT — PAIN DESCRIPTION - DESCRIPTORS: DESCRIPTORS: ACHING

## 2024-08-13 ASSESSMENT — PAIN - FUNCTIONAL ASSESSMENT: PAIN_FUNCTIONAL_ASSESSMENT: NONE - DENIES PAIN

## 2024-08-13 ASSESSMENT — PAIN DESCRIPTION - ORIENTATION: ORIENTATION: RIGHT

## 2024-08-13 ASSESSMENT — PAIN DESCRIPTION - LOCATION: LOCATION: ABDOMEN

## 2024-08-13 NOTE — PROGRESS NOTES
Critical care team - Resident sign-out to medicine service      Date and time: 8/13/2024 3:12 PM  Patient's name:  Jose Spence  Medical Record Number: [id number]  Patient's account/billing number: [id number]  Patient's YOB: 1960  Age: 64 y.o.  Date of Admission: 8/9/2024  5:27 PM  Length of stay during current admission: 4    Primary Care Physician: No primary care provider on file.    Code Status: Full Code    Mode of physician to physician communication:        [x] Via telephone   [] In person     Date and time of sign-out: 8/13/2024 3:12 PM    Accepting Internal Medicine resident: Dr. Miller    Accepting Medicine team: IM Team 3    Accepting team's attending: Dr. Conn    Patient's current ICU Bed:  3016     Patient's assigned bed on floor:  343        [x] Med-Surg Monitored [] Step-down       [] Psychiatry ICU       [] Psych floor     Reason for ICU admission:     Liver cirrhosis with Upper GI bleed     ICU course summary:     64 y.o. with unknown past medical history as he told us that he has not seen any physician in last 35 years came into the [facility] ICU due to worsening shortness of breath from huge abdominal tense ascites as a transfer from outlying facility.  Of note patient was admitted in [facility] on July 8 for worsening abdominal distention and bloating.  Upon further questioning that time patient stated he is persistently short of breath and has been gradually worsening.  Is been ongoing for last 5 months.  He reported her file and that he is having bloody stool that has been ongoing for last few month since March of this year however he has not seek any medical attention for that.  Patient drinks about 5-6 drinks daily for many years.  He told me that he bought a bar.  He does smoke half a pack a day.  Patient was evaluated by GI in [facility] last month however patient left AMA and never followed up with liver clinic.  GI at that time offered upper EGD  and colonoscopy however patient never followed up with them for further consultation.  Today on 8/9/2024 patient went to an outlying facility of [facility] for worsening shortness of breath because of tense ascites.  Patient said the shortness of breath is progressively getting worse and is making him hard to breathe.     Patient underwent extensive workup in the outlying facility.  Hemoglobin 5.5, received 2 unit of packed RBC during the transfer.  Repeat hemoglobin after coming to [place] was 7.4.  WBC count was 8.3, MCV elevated to 116, lactate up trended to 2.9, sodium 127, potassium 3.4, LFT showed total bili 7.9, AST 64, ALT 33, ammonia 47     CT chest without contrast showed large pleural effusion with pulmonary volume loss likely secondary to compressive atelectasis, CT abdomen and pelvis with contrast showed large volume ascites with bilateral large pleural effusion with diffuse anasarca, cirrhotic liver morphology with patent portal vein.     8/10  5L removed on paracentesis. Hepatitis C positive      8/11- hgb 7.0. transfused 1 unit pRBC. Given 10mg vitamin K     8/12: thoracocentesis 1L removed. Paracentesis 7400 mL removed      8/13: EGD completed: candida esophagitis. Two columns of large varices. 3 x bands. Moderate-severe portal gastropathy      Blood products administered: 5 units ffp, 2 units pRBC, and 1 unit platelets     Procedures during patient's ICU stay:     Paracentesis x 2   EGD  Thoracocentesis     Current Vitals:     /65   Pulse 85   Temp 97.2 °F (36.2 °C) (Oral)   Resp 14   Ht 1.905 m (6' 3\")   Wt 79.7 kg (175 lb 11.3 oz)   SpO2 93%   BMI 21.96 kg/m²       Cultures:     Blood cultures:                 [x] None drawn      [] Negative             []  Positive (Details:  )  Urine Culture:                   [x] None drawn      [] Negative             []  Positive (Details:  )  Sputum Culture:               [] None drawn       [] Negative             []  Positive (Details:  )

## 2024-08-13 NOTE — PLAN OF CARE
Problem: Respiratory - Adult  Goal: Achieves optimal ventilation and oxygenation  8/12/2024 2152 by Lesly Carmichael, DAWSON  Outcome: Progressing   PROVIDE ADEQUATE OXYGENATION WITH ACCEPTABLE SP02/ABG'S    [x]  IDENTIFY APPROPRIATE OXYGEN THERAPY  [x]   MONITOR SP02/ABG'S AS NEEDED   [x]   PATIENT EDUCATION AS NEEDED  BRONCHOSPASM/BRONCHOCONSTRICTION     [x]         IMPROVE AERATION/BREATH SOUNDS  [x]   ADMINISTER BRONCHODILATOR THERAPY AS APPROPRIATE  [x]   ASSESS BREATH SOUNDS  []   IMPLEMENT AEROSOL/MDI PROTOCOL  [x]   PATIENT EDUCATION AS NEEDED

## 2024-08-13 NOTE — PLAN OF CARE
Problem: Discharge Planning  Goal: Discharge to home or other facility with appropriate resources  8/12/2024 2012 by Terri Corea RN  Outcome: Progressing     Problem: Skin/Tissue Integrity  Goal: Absence of new skin breakdown  Description: 1.  Monitor for areas of redness and/or skin breakdown  2.  Assess vascular access sites hourly  3.  Every 4-6 hours minimum:  Change oxygen saturation probe site  4.  Every 4-6 hours:  If on nasal continuous positive airway pressure, respiratory therapy assess nares and determine need for appliance change or resting period.  8/12/2024 2012 by Terri Corea, RN  Outcome: Progressing     Problem: Safety - Adult  Goal: Free from fall injury  8/12/2024 2012 by Terri Corea RN  Outcome: Progressing     Problem: Respiratory - Adult  Goal: Achieves optimal ventilation and oxygenation  8/12/2024 2012 by Terri Corea, RN  Outcome: Progressing     Problem: ABCDS Injury Assessment  Goal: Absence of physical injury  8/12/2024 2012 by Terri Corea, RN  Outcome: Progressing     Problem: Pain  Goal: Verbalizes/displays adequate comfort level or baseline comfort level  8/12/2024 2012 by Terri Corea, RN  Outcome: Progressing

## 2024-08-13 NOTE — ANESTHESIA POSTPROCEDURE EVALUATION
Department of Anesthesiology  Postprocedure Note    Patient: Jose Spence  MRN: [id number]  YOB: 1960  Date of evaluation: 8/13/2024    Procedure Summary       Date: 08/13/24 Room / Location: [address]    Anesthesia Start: 0740 Anesthesia Stop: 0815    Procedures:       ESOPHAGOGASTRODUODENOSCOPY BIOPSY      ESOPHAGOGASTRODUODENOSCOPY CONTROL HEMORRHAGE Diagnosis:       Melena      (Melena [K92.1])    Surgeons: Enrique Saucedo MD Responsible Provider: Sheryl Mead MD    Anesthesia Type: MAC, general ASA Status: 3            Anesthesia Type: No value filed.    Tamika Phase I: Tamika Score: 8    Tamika Phase II:      Anesthesia Post Evaluation    Patient location during evaluation: PACU  Patient participation: complete - patient participated  Level of consciousness: awake and alert  Pain score: 0  Airway patency: patent  Nausea & Vomiting: no vomiting and no nausea  Cardiovascular status: hemodynamically stable  Respiratory status: acceptable  Hydration status: stable  Pain management: adequate    No notable events documented.

## 2024-08-13 NOTE — ANESTHESIA PRE PROCEDURE
08/12/24 0911   • sodium chloride flush 0.9 % injection 5-40 mL  5-40 mL IntraVENous PRN Jimmy Sierra MD       • 0.9 % sodium chloride infusion   IntraVENous PRN Jimmy Sierra MD   Stopped at 08/13/24 0458   • potassium chloride (KLOR-CON M) extended release tablet 40 mEq  40 mEq Oral PRN Jimmy Sierra MD        Or   • potassium bicarb-citric acid (EFFER-K) effervescent tablet 40 mEq  40 mEq Oral PRN Jimmy Sierra MD        Or   • potassium chloride 10 mEq/100 mL IVPB (Peripheral Line)  10 mEq IntraVENous PRN Jimmy Sierra MD       • magnesium sulfate 2000 mg in 50 mL IVPB premix  2,000 mg IntraVENous PRN Jimmy Sierra MD       • ondansetron (ZOFRAN-ODT) disintegrating tablet 4 mg  4 mg Oral Q8H PRN Jimmy Sierra MD        Or   • ondansetron (ZOFRAN) injection 4 mg  4 mg IntraVENous Q6H PRN Jimmy Sierra MD       • polyethylene glycol (GLYCOLAX) packet 17 g  17 g Oral Daily PRN Jimmy Sierra MD       • acetaminophen (TYLENOL) tablet 650 mg  650 mg Oral Q6H PRN Jimmy Sierra MD        Or   • acetaminophen (TYLENOL) suppository 650 mg  650 mg Rectal Q6H PRN Jimmy Sierra MD       • octreotide (SANDOSTATIN) 500 mcg in sodium chloride 0.9 % 100 mL infusion  50 mcg/hr IntraVENous Continuous Bharath Nolen APRN - CNP 10 mL/hr at 08/13/24 0543 50 mcg/hr at 08/13/24 0543   • lactulose (CHRONULAC) 10 GM/15ML solution 20 g  20 g Oral TID Flo Diaz MD   20 g at 08/11/24 1221   • rifAXIMin (XIFAXAN) tablet 550 mg  550 mg Oral BID Irineo Villatoro MD   550 mg at 08/12/24 2041   • albuterol (PROVENTIL) (2.5 MG/3ML) 0.083% nebulizer solution 2.5 mg  2.5 mg Nebulization Q4H PRN Irineo Villatoro MD           Allergies:    Allergies   Allergen Reactions   • Penicillins Rash       Problem List:    Patient Active Problem List   Diagnosis Code   • Acute upper GI bleeding K92.2   • Ascites due to alcoholic cirrhosis (HCC) K70.31   • Decompensation of cirrhosis of liver (HCC) K72.90, K74.60   • Hepatic

## 2024-08-13 NOTE — PLAN OF CARE
GI update:    EKG reviewed-QTC WNL  Will start pt in Diflucan 400 mg IV today, then 200 mg IV/po daily for 20 days.     Pharmacy will review labs and adjust dose if needed due to potential for hepatotoxicity.       Will follow    Bharath Nolen CNP

## 2024-08-13 NOTE — PROGRESS NOTES
Critical Care - Progress Note     Patient's name:  Jose Spence  Medical Record Number: [id number]  Patient's account/billing number: [id number]  Patient's YOB: 1960  Age: 64 y.o.  Date of Admission: 8/9/2024  5:27 PM  Reason of ICU admission:   Date of History and Physical Examination: 8/13/2024      Primary Care Physician: No primary care provider on file.  Attending Physician:    Code Status: Full Code    Chief complaint: Shortness of breath likely secondary to hepatopulmonary syndrome secondary to decompensated liver cirrhosis secondary to alcohol abuse    Reason for ICU admission:   Hemoglobin 5.4, active melena, 1 episode of hematemesis, abdominal ascites leading to respiratory distress    Interval History     Remains on Nasal cannula at 2L     EGD completed which showed two large columns of varices. 3 bands placed. Also noted to have candida esophagitis throughout entire esophagus.   Also noted to have moderate to severe portal hypertensive gastropathy.     Thoracocentesis completed with 1L of fluid out. Transudate in nature.   Paracentesis completed with 7400 mL out. Therapeutic only     Given two units ffp this morning and 1 unit of pRBC as well prior to EGD     Patient hepatitis C ab positive- Hcv quantitative low around 2000  and genotype ordered pending     GI consulted - potential for EGD tomorrow. Patient doesn't seem to be having fast bleed if at all     History Of Present Illness:   History was obtained from chart review and the patient.      Jose Spence is a 64 y.o. with unknown past medical history as he told us that he has not seen any physician in last 35 years came into the [facility] ICU due to worsening shortness of breath from huge abdominal tense ascites as a transfer from outlying facility.  Of note patient was admitted in [facility] on July 8 for worsening abdominal distention and bloating.  Upon further questioning that time patient stated he is

## 2024-08-14 PROBLEM — I85.10 SECONDARY ESOPHAGEAL VARICES WITHOUT BLEEDING (HCC): Status: ACTIVE | Noted: 2024-08-14

## 2024-08-14 PROBLEM — B37.81 CANDIDA ESOPHAGITIS (HCC): Status: ACTIVE | Noted: 2024-08-14

## 2024-08-14 PROBLEM — K31.89 PORTAL HYPERTENSIVE GASTROPATHY (HCC): Status: ACTIVE | Noted: 2024-08-14

## 2024-08-14 PROBLEM — K76.6 PORTAL HYPERTENSIVE GASTROPATHY (HCC): Status: ACTIVE | Noted: 2024-08-14

## 2024-08-14 LAB
ABO/RH: NORMAL
ALBUMIN SERPL-MCNC: 3.1 G/DL (ref 3.5–5.2)
ALBUMIN/GLOB SERPL: 1 {RATIO} (ref 1–2.5)
ALP SERPL-CCNC: 56 U/L (ref 40–129)
ALT SERPL-CCNC: 19 U/L (ref 10–50)
ANION GAP SERPL CALCULATED.3IONS-SCNC: 7 MMOL/L (ref 9–16)
ANTIBODY SCREEN: NEGATIVE
ARM BAND NUMBER: NORMAL
AST SERPL-CCNC: 40 U/L (ref 10–50)
BASOPHILS # BLD: 0 K/UL (ref 0–0.2)
BASOPHILS NFR BLD: 0 % (ref 0–2)
BILIRUB DIRECT SERPL-MCNC: 2.4 MG/DL (ref 0–0.2)
BILIRUB INDIRECT SERPL-MCNC: 3.8 MG/DL (ref 0–1)
BILIRUB SERPL-MCNC: 6.2 MG/DL (ref 0–1.2)
BLOOD BANK BLOOD PRODUCT EXPIRATION DATE: NORMAL
BLOOD BANK DISPENSE STATUS: NORMAL
BLOOD BANK ISBT PRODUCT BLOOD TYPE: 600
BLOOD BANK ISBT PRODUCT BLOOD TYPE: 6200
BLOOD BANK ISBT PRODUCT BLOOD TYPE: 7300
BLOOD BANK PRODUCT CODE: NORMAL
BLOOD BANK SAMPLE EXPIRATION: NORMAL
BLOOD BANK UNIT TYPE AND RH: NORMAL
BPU ID: NORMAL
BUN SERPL-MCNC: 20 MG/DL (ref 8–23)
CALCIUM SERPL-MCNC: 8.5 MG/DL (ref 8.6–10.4)
CHLORIDE SERPL-SCNC: 103 MMOL/L (ref 98–107)
CO2 SERPL-SCNC: 26 MMOL/L (ref 20–31)
COMPONENT: NORMAL
CREAT SERPL-MCNC: 0.7 MG/DL (ref 0.7–1.2)
CROSSMATCH RESULT: NORMAL
EKG ATRIAL RATE: 79 BPM
EKG P AXIS: 57 DEGREES
EKG P-R INTERVAL: 174 MS
EKG Q-T INTERVAL: 398 MS
EKG QRS DURATION: 94 MS
EKG QTC CALCULATION (BAZETT): 456 MS
EKG R AXIS: 43 DEGREES
EKG T AXIS: 40 DEGREES
EKG VENTRICULAR RATE: 79 BPM
EOSINOPHIL # BLD: 0.08 K/UL (ref 0–0.44)
EOSINOPHILS RELATIVE PERCENT: 1 % (ref 1–4)
ERYTHROCYTE [DISTWIDTH] IN BLOOD BY AUTOMATED COUNT: 22.6 % (ref 11.8–14.4)
GFR, ESTIMATED: >90 ML/MIN/1.73M2
GLOBULIN SER CALC-MCNC: 2.7 G/DL
GLUCOSE SERPL-MCNC: 111 MG/DL (ref 74–99)
HCT VFR BLD AUTO: 26.4 % (ref 40.7–50.3)
HCT VFR BLD AUTO: 27 % (ref 40.7–50.3)
HGB BLD-MCNC: 8.5 G/DL (ref 13–17)
HGB BLD-MCNC: 9 G/DL (ref 13–17)
IMM GRANULOCYTES # BLD AUTO: 0.08 K/UL (ref 0–0.3)
IMM GRANULOCYTES NFR BLD: 1 %
INR PPP: 2.7
LKM AB TITR SER IF: NORMAL {TITER}
LYMPHOCYTES NFR BLD: 1.13 K/UL (ref 1.1–3.7)
LYMPHOCYTES RELATIVE PERCENT: 15 % (ref 24–43)
MAGNESIUM SERPL-MCNC: 1.7 MG/DL (ref 1.6–2.4)
MCH RBC QN AUTO: 33.9 PG (ref 25.2–33.5)
MCHC RBC AUTO-ENTMCNC: 32.2 G/DL (ref 28.4–34.8)
MCV RBC AUTO: 105.2 FL (ref 82.6–102.9)
MICROORGANISM SPEC CULT: NORMAL
MICROORGANISM SPEC CULT: NORMAL
MICROORGANISM/AGENT SPEC: NORMAL
MONOCYTES NFR BLD: 1.05 K/UL (ref 0.1–1.2)
MONOCYTES NFR BLD: 14 % (ref 3–12)
MORPHOLOGY: ABNORMAL
MORPHOLOGY: ABNORMAL
NEUTROPHILS NFR BLD: 69 % (ref 36–65)
NEUTS SEG NFR BLD: 5.16 K/UL (ref 1.5–8.1)
NRBC BLD-RTO: 0 PER 100 WBC
PLATELET # BLD AUTO: 56 K/UL (ref 138–453)
PMV BLD AUTO: 9.5 FL (ref 8.1–13.5)
POTASSIUM SERPL-SCNC: 3.4 MMOL/L (ref 3.7–5.3)
PROT SERPL-MCNC: 5.8 G/DL (ref 6.6–8.7)
PROTHROMBIN TIME: 27.7 SEC (ref 11.7–14.9)
RBC # BLD AUTO: 2.51 M/UL (ref 4.21–5.77)
SERVICE CMNT-IMP: NORMAL
SERVICE CMNT-IMP: NORMAL
SODIUM SERPL-SCNC: 136 MMOL/L (ref 136–145)
SPECIMEN DESCRIPTION: NORMAL
SPECIMEN DESCRIPTION: NORMAL
TRANSFUSION STATUS: NORMAL
UNIT DIVISION: 0
UNIT ISSUE DATE/TIME: NORMAL
WBC OTHER # BLD: 7.5 K/UL (ref 3.5–11.3)

## 2024-08-14 PROCEDURE — 85018 HEMOGLOBIN: CPT

## 2024-08-14 PROCEDURE — 2580000003 HC RX 258: Performed by: STUDENT IN AN ORGANIZED HEALTH CARE EDUCATION/TRAINING PROGRAM

## 2024-08-14 PROCEDURE — 6370000000 HC RX 637 (ALT 250 FOR IP): Performed by: STUDENT IN AN ORGANIZED HEALTH CARE EDUCATION/TRAINING PROGRAM

## 2024-08-14 PROCEDURE — 6360000002 HC RX W HCPCS: Performed by: STUDENT IN AN ORGANIZED HEALTH CARE EDUCATION/TRAINING PROGRAM

## 2024-08-14 PROCEDURE — 6360000002 HC RX W HCPCS: Performed by: NURSE PRACTITIONER

## 2024-08-14 PROCEDURE — 85014 HEMATOCRIT: CPT

## 2024-08-14 PROCEDURE — 2700000000 HC OXYGEN THERAPY PER DAY

## 2024-08-14 PROCEDURE — 99232 SBSQ HOSP IP/OBS MODERATE 35: CPT | Performed by: STUDENT IN AN ORGANIZED HEALTH CARE EDUCATION/TRAINING PROGRAM

## 2024-08-14 PROCEDURE — 80048 BASIC METABOLIC PNL TOTAL CA: CPT

## 2024-08-14 PROCEDURE — 99291 CRITICAL CARE FIRST HOUR: CPT | Performed by: INTERNAL MEDICINE

## 2024-08-14 PROCEDURE — 94640 AIRWAY INHALATION TREATMENT: CPT

## 2024-08-14 PROCEDURE — 36415 COLL VENOUS BLD VENIPUNCTURE: CPT

## 2024-08-14 PROCEDURE — 85025 COMPLETE CBC W/AUTO DIFF WBC: CPT

## 2024-08-14 PROCEDURE — 80076 HEPATIC FUNCTION PANEL: CPT

## 2024-08-14 PROCEDURE — 1200000000 HC SEMI PRIVATE

## 2024-08-14 PROCEDURE — 83735 ASSAY OF MAGNESIUM: CPT

## 2024-08-14 PROCEDURE — 85610 PROTHROMBIN TIME: CPT

## 2024-08-14 RX ORDER — ALBUTEROL SULFATE 2.5 MG/3ML
2.5 SOLUTION RESPIRATORY (INHALATION) EVERY 4 HOURS PRN
Status: DISCONTINUED | OUTPATIENT
Start: 2024-08-14 | End: 2024-08-15 | Stop reason: HOSPADM

## 2024-08-14 RX ADMIN — LACTULOSE 20 G: 20 SOLUTION ORAL at 08:06

## 2024-08-14 RX ADMIN — PANTOPRAZOLE SODIUM 8 MG/HR: 40 INJECTION, POWDER, FOR SOLUTION INTRAVENOUS at 07:25

## 2024-08-14 RX ADMIN — OCTREOTIDE ACETATE 50 MCG/HR: 500 INJECTION, SOLUTION INTRAVENOUS; SUBCUTANEOUS at 14:36

## 2024-08-14 RX ADMIN — LACTULOSE 20 G: 20 SOLUTION ORAL at 21:36

## 2024-08-14 RX ADMIN — FLUCONAZOLE 200 MG: 200 INJECTION, SOLUTION INTRAVENOUS at 14:19

## 2024-08-14 RX ADMIN — OCTREOTIDE ACETATE 50 MCG/HR: 500 INJECTION, SOLUTION INTRAVENOUS; SUBCUTANEOUS at 01:48

## 2024-08-14 RX ADMIN — RIFAXIMIN 550 MG: 550 TABLET ORAL at 08:06

## 2024-08-14 RX ADMIN — ALBUTEROL SULFATE 2.5 MG: 2.5 SOLUTION RESPIRATORY (INHALATION) at 07:56

## 2024-08-14 RX ADMIN — Medication 5 MG: at 21:36

## 2024-08-14 RX ADMIN — FUROSEMIDE 20 MG: 10 INJECTION, SOLUTION INTRAMUSCULAR; INTRAVENOUS at 08:06

## 2024-08-14 RX ADMIN — SPIRONOLACTONE 25 MG: 25 TABLET, FILM COATED ORAL at 08:07

## 2024-08-14 RX ADMIN — RIFAXIMIN 550 MG: 550 TABLET ORAL at 21:37

## 2024-08-14 RX ADMIN — SODIUM CHLORIDE, PRESERVATIVE FREE 10 ML: 5 INJECTION INTRAVENOUS at 08:07

## 2024-08-14 RX ADMIN — POTASSIUM BICARBONATE 40 MEQ: 782 TABLET, EFFERVESCENT ORAL at 09:04

## 2024-08-14 RX ADMIN — PANTOPRAZOLE SODIUM 8 MG/HR: 40 INJECTION, POWDER, FOR SOLUTION INTRAVENOUS at 17:01

## 2024-08-14 RX ADMIN — SODIUM CHLORIDE, PRESERVATIVE FREE 10 ML: 5 INJECTION INTRAVENOUS at 08:06

## 2024-08-14 ASSESSMENT — ENCOUNTER SYMPTOMS
ABDOMINAL PAIN: 1
ABDOMINAL DISTENTION: 1
CONSTIPATION: 0
COUGH: 1

## 2024-08-14 ASSESSMENT — PAIN SCALES - GENERAL: PAINLEVEL_OUTOF10: 0

## 2024-08-14 NOTE — PROGRESS NOTES
Critical Care - Progress Note     Patient's name:  Jose Spence  Medical Record Number: [id number]  Patient's account/billing number: [id number]  Patient's YOB: 1960  Age: 64 y.o.  Date of Admission: 8/9/2024  5:27 PM  Reason of ICU admission:   Date of History and Physical Examination: 8/14/2024      Primary Care Physician: No primary care provider on file.  Attending Physician:    Code Status: Full Code    Chief complaint: Shortness of breath likely secondary to hepatopulmonary syndrome secondary to decompensated liver cirrhosis secondary to alcohol abuse    Reason for ICU admission:   Hemoglobin 5.4, active melena, 1 episode of hematemesis, abdominal ascites leading to respiratory distress    Interval History     On 2L NC     EGD completed 8/13 which showed two large columns of varices. 3 bands placed. Also noted to have candida esophagitis throughout entire esophagus.   Also noted to have moderate to severe portal hypertensive gastropathy.     Thoracocentesis completed with 1L of fluid out. Transudate in nature.   Paracentesis completed with 7400 mL out. Therapeutic only     Given two units ffp this morning and 1 unit of pRBC as well prior to EGD     Patient hepatitis C ab positive- Hcv quantitative low around 2000  and genotype ordered pending     GI consulted - EGD completed with above findings. Needs to continue octreotide and protonix for three total days. Needs fluconazole for severe candida esophagitis     History Of Present Illness:   History was obtained from chart review and the patient.      Jose Spence is a 64 y.o. with unknown past medical history as he told us that he has not seen any physician in last 35 years came into the [facility] ICU due to worsening shortness of breath from huge abdominal tense ascites as a transfer from outlying facility.  Of note patient was admitted in [facility] on July 8 for worsening abdominal distention and bloating.  Upon further

## 2024-08-14 NOTE — PLAN OF CARE
Problem: Discharge Planning  Goal: Discharge to home or other facility with appropriate resources  Outcome: Progressing     Problem: Skin/Tissue Integrity  Goal: Absence of new skin breakdown  Description: 1.  Monitor for areas of redness and/or skin breakdown  2.  Assess vascular access sites hourly  3.  Every 4-6 hours minimum:  Change oxygen saturation probe site  4.  Every 4-6 hours:  If on nasal continuous positive airway pressure, respiratory therapy assess nares and determine need for appliance change or resting period.  Outcome: Progressing     Problem: Safety - Adult  Goal: Free from fall injury  Outcome: Progressing     Problem: Respiratory - Adult  Goal: Achieves optimal ventilation and oxygenation  Outcome: Progressing     Problem: ABCDS Injury Assessment  Goal: Absence of physical injury  Outcome: Progressing  Flowsheets (Taken 8/14/2024 6913)  Absence of Physical Injury: Implement safety measures based on patient assessment     Problem: Pain  Goal: Verbalizes/displays adequate comfort level or baseline comfort level  Outcome: Progressing

## 2024-08-14 NOTE — PROGRESS NOTES
1115- Transferred Patient to  room 343, vitally stable. Oriented to Person and Time (disoriented sometimes). On 02 support at 2lpm. File given to RN. Belongings kept at bedside.

## 2024-08-14 NOTE — PROGRESS NOTES
[facility]   Gastroenterology Progress Note    Jose Spence is a 64 y.o. male patient.  Hospitalization Day:5      Chief consult reason:   Decompensated cirrhosis with melena     Subjective:  Patient seen and examined  No acute events overnight  Pt had endoscopy yesterday that identified candida esophagitis, moderate to severe portal hypertension, 2 columns of large varices in lower esophagus treated with 3 bands. BX pending  No rectal bleeding overnight  BUN normalized today, Hgb 8.5-9.0 g/dL  LFTs greatly improved today with bilirubin from 9.5-6.2, INR 2.7      8/13/2024 EGD per Dr. Saucedo  Findings:     Retropharyngeal area was grossly normal appearing     Esophagus: abnormal: Evidence of white exudate in upper, middle and lower esophagus with mild underlying inflammation rule out Candida-biopsied     2 columns of large varices in lower esophagus-banded x 3     Stomach:    Fundus: Not fully visualized due to food in fundus    Body: abnormal: Moderate to severe portal hypertensive gastropathy    Antrum: abnormal: Gastropathy  Biopsy obtained to r/o- H pylori and histology     Duodenum:     Descending: normal    Bulb: normal     The scope was removed and the patient tolerated the procedure well.      Impression-   2 columns of large varices-banded x 3  Moderate to severe portal hypertensive gastropathy  Candida esophagitis     Recommendations/Plan:   Return to ICU for ongoing care  Clear liquid diet 4 hours from now, can have soft diet from tomorrow morning   Continue PPI infusion and octreotide for another 3 days.  After that he needs Protonix 40 mg p.o. twice daily for 2 months  Obtain EKG.  May start fluconazole 200 mg for 21 days as long as QT less than 450 otherwise start nystatin  Vit K and platelets  IR consult for consideration of TIPS  F/U Biopsies  Repeat EGD variceal surveillance in 2-4 weeks to also visualize for fundus     Electronically signed by Enrique Saucedo MD  on  It is approximately 50% sludge filled and contains a few subcentimeter, nonshadowing gallstones in the dependent neck.  Incidental note of a 2-3 mm gallbladder wall polyp.  The common duct is normal in caliber at 3 mm diameter. RIGHT KIDNEY: The right kidney is grossly unremarkable without evidence of hydronephrosis. Length is 11.8 cm. PANCREAS:  Pancreas not well visualized. OTHER: Moderate diffuse ascites.     1. Cirrhotic liver with moderate diffuse ascites. 2. Cholelithiasis and sludge filled gallbladder with marked gallbladder wall thickening. No hyperemia. Findings are nonspecific but could be related to underlying liver disease.  Correlate clinically and with laboratory data for the less likely possibility of acute cholecystitis. 3. Incidental note of a 2-3 mm gallbladder wall polyp. 4. Normal Doppler flow characteristics in the portal veins, hepatic veins, hepatic arteries, splenic vein and IVC.         Principal Problem:    Acute upper GI bleeding  Active Problems:    Ascites due to alcoholic cirrhosis (HCC)    Decompensation of cirrhosis of liver (HCC)    Hepatic encephalopathy (HCC)    ELIO (acute kidney injury) (HCC)    Cardiomyopathy (HCC)  Resolved Problems:    * No resolved hospital problems. *       GI Impression:    Decompensated cirrhosis with ascites on imaging, ??  Melena  -Ultrasound of liver negative for portal vein thrombosis.    -US consistent with cirrhosis, cholelithiasis and sludge filled gallbladder with marked thickening and to two 3 mm gallbladder wall polyp  -AFP WNL  -Still awaiting hepatitis C viral load and genotyping  -Ceruloplasmin slightly low at 10, alpha 1 antitrypsin slightly low 79  -Other serologies still pending  -s/p  endoscopy with 3 bands applied to 2 columns of esophageal varices, portal hypertensive gastropathy    Acute blood loss anemia-no overt GI bleeding  ELIO -resolved  Ascites  -s/p paracentesis w/5L removed, no SBP, SAAG >1.1-indicative of portal hypertension,

## 2024-08-14 NOTE — PLAN OF CARE
Problem: Discharge Planning  Goal: Discharge to home or other facility with appropriate resources  Outcome: Progressing     Problem: Skin/Tissue Integrity  Goal: Absence of new skin breakdown  Description: 1.  Monitor for areas of redness and/or skin breakdown  2.  Assess vascular access sites hourly  3.  Every 4-6 hours minimum:  Change oxygen saturation probe site  4.  Every 4-6 hours:  If on nasal continuous positive airway pressure, respiratory therapy assess nares and determine need for appliance change or resting period.  Outcome: Progressing     Problem: Safety - Adult  Goal: Free from fall injury  Outcome: Progressing  Flowsheets (Taken 8/13/2024 2045)  Free From Fall Injury: Instruct family/caregiver on patient safety     Problem: Respiratory - Adult  Goal: Achieves optimal ventilation and oxygenation  Outcome: Progressing     Problem: ABCDS Injury Assessment  Goal: Absence of physical injury  Outcome: Progressing  Flowsheets (Taken 8/13/2024 2045)  Absence of Physical Injury: Implement safety measures based on patient assessment     Problem: Pain  Goal: Verbalizes/displays adequate comfort level or baseline comfort level  Outcome: Progressing

## 2024-08-15 VITALS
DIASTOLIC BLOOD PRESSURE: 63 MMHG | HEIGHT: 75 IN | TEMPERATURE: 98.2 F | WEIGHT: 176 LBS | SYSTOLIC BLOOD PRESSURE: 105 MMHG | RESPIRATION RATE: 16 BRPM | HEART RATE: 84 BPM | BODY MASS INDEX: 21.88 KG/M2 | OXYGEN SATURATION: 95 %

## 2024-08-15 PROBLEM — I85.01 BLEEDING ESOPHAGEAL VARICES (HCC): Status: ACTIVE | Noted: 2024-08-15

## 2024-08-15 PROBLEM — R18.8 CIRRHOSIS OF LIVER WITH ASCITES (HCC): Status: ACTIVE | Noted: 2024-08-11

## 2024-08-15 PROBLEM — R18.8 OTHER ASCITES: Status: ACTIVE | Noted: 2024-08-10

## 2024-08-15 PROBLEM — D62 ACUTE BLOOD LOSS ANEMIA: Status: ACTIVE | Noted: 2024-08-15

## 2024-08-15 LAB
ALBUMIN SERPL-MCNC: 2.7 G/DL (ref 3.5–5.2)
ALBUMIN/GLOB SERPL: 1 {RATIO} (ref 1–2.5)
ALP SERPL-CCNC: 70 U/L (ref 40–129)
ALT SERPL-CCNC: 19 U/L (ref 10–50)
AMMONIA PLAS-SCNC: 64 UMOL/L (ref 16–60)
ANION GAP SERPL CALCULATED.3IONS-SCNC: 2 MMOL/L (ref 9–16)
AST SERPL-CCNC: 37 U/L (ref 10–50)
BASOPHILS # BLD: 0 K/UL (ref 0–0.2)
BASOPHILS NFR BLD: 0 % (ref 0–2)
BILIRUB DIRECT SERPL-MCNC: 2.3 MG/DL (ref 0–0.2)
BILIRUB INDIRECT SERPL-MCNC: 3.8 MG/DL (ref 0–1)
BILIRUB SERPL-MCNC: 6.1 MG/DL (ref 0–1.2)
BUN SERPL-MCNC: 11 MG/DL (ref 8–23)
CALCIUM SERPL-MCNC: 8.3 MG/DL (ref 8.6–10.4)
CHLORIDE SERPL-SCNC: 104 MMOL/L (ref 98–107)
CO2 SERPL-SCNC: 30 MMOL/L (ref 20–31)
CREAT SERPL-MCNC: 0.6 MG/DL (ref 0.7–1.2)
EOSINOPHIL # BLD: 0.15 K/UL (ref 0–0.44)
EOSINOPHILS RELATIVE PERCENT: 2 % (ref 1–4)
ERYTHROCYTE [DISTWIDTH] IN BLOOD BY AUTOMATED COUNT: 21.3 % (ref 11.8–14.4)
GFR, ESTIMATED: >90 ML/MIN/1.73M2
GLOBULIN SER CALC-MCNC: 2.8 G/DL
GLUCOSE SERPL-MCNC: 97 MG/DL (ref 74–99)
HCT VFR BLD AUTO: 26.9 % (ref 40.7–50.3)
HCV GENTYP SERPL NAA+PROBE: NORMAL
HGB BLD-MCNC: 8.8 G/DL (ref 13–17)
IMM GRANULOCYTES # BLD AUTO: 0.08 K/UL (ref 0–0.3)
IMM GRANULOCYTES NFR BLD: 1 %
LYMPHOCYTES NFR BLD: 1.85 K/UL (ref 1.1–3.7)
LYMPHOCYTES RELATIVE PERCENT: 24 % (ref 24–43)
MCH RBC QN AUTO: 34.2 PG (ref 25.2–33.5)
MCHC RBC AUTO-ENTMCNC: 32.7 G/DL (ref 28.4–34.8)
MCV RBC AUTO: 104.7 FL (ref 82.6–102.9)
MICROORGANISM SPEC CULT: NORMAL
MICROORGANISM/AGENT SPEC: NORMAL
MONOCYTES NFR BLD: 1.16 K/UL (ref 0.1–1.2)
MONOCYTES NFR BLD: 15 % (ref 3–12)
MORPHOLOGY: ABNORMAL
NEUTROPHILS NFR BLD: 58 % (ref 36–65)
NEUTS SEG NFR BLD: 4.46 K/UL (ref 1.5–8.1)
NRBC BLD-RTO: 0 PER 100 WBC
PLATELET # BLD AUTO: ABNORMAL K/UL (ref 138–453)
PLATELET, FLUORESCENCE: 53 K/UL (ref 138–453)
PLATELETS.RETICULATED NFR BLD AUTO: 2.6 % (ref 1.1–10.3)
POTASSIUM SERPL-SCNC: 3.8 MMOL/L (ref 3.7–5.3)
PROT SERPL-MCNC: 5.5 G/DL (ref 6.6–8.7)
RBC # BLD AUTO: 2.57 M/UL (ref 4.21–5.77)
RBC # BLD: ABNORMAL 10*6/UL
RBC # BLD: ABNORMAL 10*6/UL
SERVICE CMNT-IMP: NORMAL
SODIUM SERPL-SCNC: 136 MMOL/L (ref 136–145)
SPECIMEN DESCRIPTION: NORMAL
WBC OTHER # BLD: 7.7 K/UL (ref 3.5–11.3)

## 2024-08-15 PROCEDURE — 99232 SBSQ HOSP IP/OBS MODERATE 35: CPT | Performed by: STUDENT IN AN ORGANIZED HEALTH CARE EDUCATION/TRAINING PROGRAM

## 2024-08-15 PROCEDURE — 80048 BASIC METABOLIC PNL TOTAL CA: CPT

## 2024-08-15 PROCEDURE — 6370000000 HC RX 637 (ALT 250 FOR IP): Performed by: STUDENT IN AN ORGANIZED HEALTH CARE EDUCATION/TRAINING PROGRAM

## 2024-08-15 PROCEDURE — 2580000003 HC RX 258: Performed by: STUDENT IN AN ORGANIZED HEALTH CARE EDUCATION/TRAINING PROGRAM

## 2024-08-15 PROCEDURE — 6360000002 HC RX W HCPCS: Performed by: STUDENT IN AN ORGANIZED HEALTH CARE EDUCATION/TRAINING PROGRAM

## 2024-08-15 PROCEDURE — 85025 COMPLETE CBC W/AUTO DIFF WBC: CPT

## 2024-08-15 PROCEDURE — 80076 HEPATIC FUNCTION PANEL: CPT

## 2024-08-15 PROCEDURE — 85055 RETICULATED PLATELET ASSAY: CPT

## 2024-08-15 PROCEDURE — 82140 ASSAY OF AMMONIA: CPT

## 2024-08-15 PROCEDURE — 36415 COLL VENOUS BLD VENIPUNCTURE: CPT

## 2024-08-15 RX ORDER — MIDODRINE HYDROCHLORIDE 5 MG/1
5 TABLET ORAL 3 TIMES DAILY PRN
Status: DISCONTINUED | OUTPATIENT
Start: 2024-08-15 | End: 2024-08-15 | Stop reason: HOSPADM

## 2024-08-15 RX ADMIN — SODIUM CHLORIDE, PRESERVATIVE FREE 10 ML: 5 INJECTION INTRAVENOUS at 09:09

## 2024-08-15 RX ADMIN — SPIRONOLACTONE 25 MG: 25 TABLET, FILM COATED ORAL at 09:10

## 2024-08-15 RX ADMIN — RIFAXIMIN 550 MG: 550 TABLET ORAL at 09:08

## 2024-08-15 RX ADMIN — FUROSEMIDE 20 MG: 10 INJECTION, SOLUTION INTRAMUSCULAR; INTRAVENOUS at 09:08

## 2024-08-15 RX ADMIN — PANTOPRAZOLE SODIUM 8 MG/HR: 40 INJECTION, POWDER, FOR SOLUTION INTRAVENOUS at 04:49

## 2024-08-15 RX ADMIN — OCTREOTIDE ACETATE 50 MCG/HR: 500 INJECTION, SOLUTION INTRAVENOUS; SUBCUTANEOUS at 01:04

## 2024-08-15 NOTE — PROGRESS NOTES
[facility]  Internal Medicine Teaching Residency Program  Inpatient Daily Progress Note  ______________________________________________________________________________    Patient: Jose Spence  YOB: 1960   MRN:[id number]    Acct: [id number]     Room: 0343/0343-02  Admit date: 8/9/2024  Today's date: 08/15/24  Number of days in the hospital: 6    SUBJECTIVE   CC: No chief complaint on file.    Pt examined at bedside. Chart & results reviewed.   -VSS, pt is saturating well on 2 L nasal cannula  -labs reviewed   -no acute events overnight.     ROS:  General ROS: Completed and except as mentioned above were negative   HEENT ROS: Completed and except as mentioned above were negative   Allergy and Immunology ROS:  Completed and except as mentioned above were negative  Hematological and Lymphatic ROS:  Completed and except as mentioned above were negative  Respiratory ROS:  Completed and except as mentioned above were negative  Cardiovascular ROS:  Completed and except as mentioned above were negative  Gastrointestinal ROS: Completed and except as mentioned above were negative  Genito-Urinary ROS:  Completed and except as mentioned above were negative  Musculoskeletal ROS:  Completed and except as mentioned above were negative  Neurological ROS:  Completed and except as mentioned above were negative  Skin & Dermatological ROS:  Completed and except as mentioned above were negative  Psychological ROS:  Completed and except as mentioned above were negative  BRIEF HISTORY     Jose Spence is a 64 y.o. with unknown past medical history as he told us that he has not seen any physician in last 35 years came into the [facility] ICU due to worsening shortness of breath from huge abdominal tense asc[facility]  Of note patient was admitted in [facility] on July 8 for worsening abdominal distention and bloating.  Upon further  questioning that time patient stated he is persistently short of breath and has been gradually worsening.  Is been ongoing for last 5 months.  He reported her file and that he is having bloody stool that has been ongoing for last few month since March of this year however he has not seek any medical attention for that.  Patient drinks about 5-6 drinks daily for many years.  He told me that he bought a bar.  He does smoke half a pack a day.  Patient was evaluated by GI in [facility] last month however patient left AMA and never followed up with liver clinic.  GI at that time offered upper EGD and colonoscopy however patient never followed up with them for further consultation.  Today on 8/9/2024 patient went to an outlying facility of [facility] for worsening shortness of breath because of tense ascites.  Patient said the shortness of breath is progressively getting worse and is making him hard to breathe.     Patient underwent extensive workup in the outlying facility.  Hemoglobin 5.5, received 2 unit of packed RBC during the transfer.  Repeat hemoglobin after coming to [place] was 7.4.  WBC count was 8.3, MCV elevated to 116, lactate up trended to 2.9, sodium 127, potassium 3.4, LFT showed total bili 7.9, AST 64, ALT 33, ammonia 47     CT chest without contrast showed large pleural effusion with pulmonary volume loss likely secondary to compressive atelectasis, CT abdomen and pelvis with contrast showed large volume ascites with bilateral large pleural effusion with diffuse anasarca, cirrhotic liver morphology with patent portal vein.  8/10  5L removed on paracentesis. Hepatitis C positive   8/11- hgb 7.0. transfused 1 unit pRBC. Given 10mg vitamin K   8/12- thoracentesis completed with 1L removed- transudate. 7.4 L removed on paracentesis - therapeutic.   8/13: EGD completed: candida esophagitis. Two columns of large varices. 3 x bands. Moderate-severe portal gastropathy     OBJECTIVE     Vital

## 2024-08-15 NOTE — PROGRESS NOTES
INTERNAL MEDICINE                                                                             ICU PATIENT TRANSFER NOTE        Patient: Jose Spence  YOB: 1960  MRN: [id number]     Acct: [id number]     Admit date: 8/9/2024    Code Status: Full Code      Reason for ICU Admission:     SUPPORT DEVICES: [] Ventilator  [] BIPAP  [x] Nasal Cannula [] Room Air    Consultations: [] Cardiology [] Nephrology  [] Hemo onco  [x] GI                               [] ID [] ENT  [] Rheum [] Endo   []Physiotherapy       NUTRITION: [] NPO [] Tube Feeding (Specify: ) [] TPN [x] PO    Central Lines:  [] No  [] Yes           If yes - Days/Date of Insertion.      Pt seen and examined at bedside. Chart and results reviewed.    ICU COURSE:     Jose Spence is a 64 y.o. with unknown past medical history as he told us that he has not seen any physician in last 35 years came into the [facility] ICU due to worsening shortness of breath from huge abdominal tense ascites as a transfer from outlying facility.  Of note patient was admitted in [facility] on July 8 for worsening abdominal distention and bloating.  Upon further questioning that time patient stated he is persistently short of breath and has been gradually worsening.  Is been ongoing for last 5 months.  He reported her file and that he is having bloody stool that has been ongoing for last few month since March of this year however he has not seek any medical attention for that.  Patient drinks about 5-6 drinks daily for many years.  He told me that he bought a bar.  He does smoke half a pack a day.  Patient was evaluated by GI in [facility] last month however patient left AMA and never followed up with liver clinic.  GI at that time offered upper EGD and colonoscopy however patient never followed up with

## 2024-08-15 NOTE — PROGRESS NOTES
Pt. Insisted on signing AMA paperwork. Writer educated on risks of leaving. Writer contacted physician. Physician came and spoke to pt. Pt. Signed AMA paperwork. IVs taken out.

## 2024-08-15 NOTE — PLAN OF CARE
Problem: Discharge Planning  Goal: Discharge to home or other facility with appropriate resources  Outcome: Progressing     Problem: Skin/Tissue Integrity  Goal: Absence of new skin breakdown  Description: 1.  Monitor for areas of redness and/or skin breakdown  2.  Assess vascular access sites hourly  3.  Every 4-6 hours minimum:  Change oxygen saturation probe site  4.  Every 4-6 hours:  If on nasal continuous positive airway pressure, respiratory therapy assess nares and determine need for appliance change or resting period.  Outcome: Progressing     Problem: Safety - Adult  Goal: Free from fall injury  Outcome: Progressing     Problem: Respiratory - Adult  Goal: Achieves optimal ventilation and oxygenation  Outcome: Progressing     Problem: ABCDS Injury Assessment  Goal: Absence of physical injury  Outcome: Progressing     Problem: Pain  Goal: Verbalizes/displays adequate comfort level or baseline comfort level  Outcome: Progressing     Problem: Gastrointestinal - Adult  Goal: Minimal or absence of nausea and vomiting  Outcome: Progressing  Goal: Maintains or returns to baseline bowel function  Outcome: Progressing  Goal: Maintains adequate nutritional intake  Outcome: Progressing     Problem: Chronic Conditions and Co-morbidities  Goal: Patient's chronic conditions and co-morbidity symptoms are monitored and maintained or improved  Outcome: Progressing

## 2024-08-15 NOTE — PLAN OF CARE
[employer] message from RN stating that patient wants to leave AMA.  Went to bedside to assess.  Patient was getting ready to leave the hospital.  I explained to the patient that we still want to keep him inpatient and monitor his hemoglobin and make sure he does not bleed again from this varices.  Patient is currently Aox4.  Hemodynamically stable and maintaining saturation on room air.  Explained the patient that if he goes home, if he bleeds from his varices again, he can lose blood and all the potential complications including death.  Patient acknowledges and understands all the complications and risks of leaving the hospital AGAINST MEDICAL ADVICE including death.  Patient verbalizes understanding.  Patient denies any depressed mood or suicidal ideations..  Patient signed AMA forms.    Francisco Sy MD  PGY-2, Internal Medicine Resident  [facility] [place]         8/15/2024, 1:33 PM

## 2024-08-16 LAB — SURGICAL PATHOLOGY REPORT: NORMAL

## 2024-08-16 NOTE — DISCHARGE SUMMARY
[facility]     Department of Internal Medicine - Staff Internal Medicine Teaching Service    INPATIENT DISCHARGE SUMMARY      Patient Identification:  Jose Spence is a 64 y.o. male.  :  1960  MRN: [id number]     Acct: [id number]   PCP: No primary care provider on file.  Admit Date:  2024  Discharge date and time: 8/15/2024  1:46 PM   Attending Provider: No att. providers found                                     ACTIVE DISCHARGE DIAGNOSES     Hospital Problem Lists:  Principal Problem:    Acute upper GI bleeding  Active Problems:    Other ascites    Cirrhosis of liver with ascites (HCC)    Hepatic encephalopathy (HCC)    ELIO (acute kidney injury) (HCC)    Cardiomyopathy (HCC)    Secondary esophageal varices without bleeding (HCC)    Esophageal candidiasis (HCC)    Portal hypertensive gastropathy (HCC)    Acute blood loss anemia    Bleeding esophageal varices (HCC)  Resolved Problems:    * No resolved hospital problems. *      HOSPITAL STAY     Brief Inpatient course:   Jose Spence is a 64 y.o. with unknown past medical history as he told us that he has not seen any physician in last 35 years came into the [facility] ICU due to worsening shortness of breath from huge abdominal tense ascites as a transfer from outlying facility.  Of note patient was admitted in [facility] on  for worsening abdominal distention and bloating.  Upon further questioning that time patient stated he is persistently short of breath and has been gradually worsening.  Is been ongoing for last 5 months.  He reported her file and that he is having bloody stool that has been ongoing for last few month since March of this year however he has not seek any medical attention for that.  Patient drinks about 5-6 drinks daily for many years.  He told me that he bought a bar.  He does smoke half a pack a day.  Patient was evaluated by GI in [facility] last month however

## 2024-08-20 ENCOUNTER — TELEPHONE (OUTPATIENT)
Dept: GASTROENTEROLOGY | Age: 64
End: 2024-08-20

## 2024-08-20 NOTE — TELEPHONE ENCOUNTER
Writer left message on voicemail for patient to contact our office to be scheduled for Repeat EGD variceal surveillance in 2-4 weeks to also visualize for fundus per Dr Saucedo.

## 2024-08-22 LAB
MICROORGANISM SPEC CULT: ABNORMAL
MICROORGANISM SPEC CULT: ABNORMAL
MICROORGANISM/AGENT SPEC: ABNORMAL
SERVICE CMNT-IMP: ABNORMAL
SPECIMEN DESCRIPTION: ABNORMAL

## (undated) DEVICE — LIGATOR ENDOSCP L2.8MM DIA8.6-11.5MM MULT BND SPEEDBAND

## (undated) DEVICE — SINGLE-USE BIOPSY FORCEPS: Brand: RADIAL JAW 4